# Patient Record
Sex: MALE | Race: OTHER | NOT HISPANIC OR LATINO
[De-identification: names, ages, dates, MRNs, and addresses within clinical notes are randomized per-mention and may not be internally consistent; named-entity substitution may affect disease eponyms.]

---

## 2019-04-09 ENCOUNTER — APPOINTMENT (OUTPATIENT)
Dept: ORTHOPEDIC SURGERY | Facility: CLINIC | Age: 44
End: 2019-04-09
Payer: COMMERCIAL

## 2019-04-09 ENCOUNTER — TRANSCRIPTION ENCOUNTER (OUTPATIENT)
Age: 44
End: 2019-04-09

## 2019-04-09 VITALS
HEIGHT: 68 IN | SYSTOLIC BLOOD PRESSURE: 124 MMHG | DIASTOLIC BLOOD PRESSURE: 82 MMHG | HEART RATE: 76 BPM | WEIGHT: 215 LBS | BODY MASS INDEX: 32.58 KG/M2

## 2019-04-09 DIAGNOSIS — M79.672 PAIN IN LEFT FOOT: ICD-10-CM

## 2019-04-09 PROCEDURE — 73630 X-RAY EXAM OF FOOT: CPT | Mod: LT

## 2019-04-09 PROCEDURE — 99203 OFFICE O/P NEW LOW 30 MIN: CPT

## 2019-04-14 PROBLEM — M79.672 LEFT FOOT PAIN: Status: ACTIVE | Noted: 2019-04-09

## 2019-11-22 ENCOUNTER — APPOINTMENT (OUTPATIENT)
Dept: ORTHOPEDIC SURGERY | Facility: CLINIC | Age: 44
End: 2019-11-22

## 2019-12-25 ENCOUNTER — TRANSCRIPTION ENCOUNTER (OUTPATIENT)
Age: 44
End: 2019-12-25

## 2019-12-25 ENCOUNTER — INPATIENT (INPATIENT)
Facility: HOSPITAL | Age: 44
LOS: 1 days | Discharge: HOME | DRG: 419 | End: 2019-12-27
Attending: SURGERY | Admitting: SURGERY
Payer: COMMERCIAL

## 2019-12-25 VITALS
OXYGEN SATURATION: 99 % | HEART RATE: 59 BPM | TEMPERATURE: 98 F | WEIGHT: 212.08 LBS | RESPIRATION RATE: 20 BRPM | HEIGHT: 68 IN | SYSTOLIC BLOOD PRESSURE: 148 MMHG | DIASTOLIC BLOOD PRESSURE: 98 MMHG

## 2019-12-25 PROCEDURE — 99285 EMERGENCY DEPT VISIT HI MDM: CPT

## 2019-12-25 RX ORDER — KETOROLAC TROMETHAMINE 30 MG/ML
15 SYRINGE (ML) INJECTION ONCE
Refills: 0 | Status: DISCONTINUED | OUTPATIENT
Start: 2019-12-25 | End: 2019-12-25

## 2019-12-25 RX ORDER — SODIUM CHLORIDE 9 MG/ML
1000 INJECTION, SOLUTION INTRAVENOUS ONCE
Refills: 0 | Status: COMPLETED | OUTPATIENT
Start: 2019-12-25 | End: 2019-12-25

## 2019-12-25 RX ORDER — ACETAMINOPHEN 500 MG
650 TABLET ORAL ONCE
Refills: 0 | Status: COMPLETED | OUTPATIENT
Start: 2019-12-25 | End: 2019-12-25

## 2019-12-25 NOTE — ED PROVIDER NOTE - CLINICAL SUMMARY MEDICAL DECISION MAKING FREE TEXT BOX
Manoj PGY-2:  43yo M h/o gallstones here with sudden RUQ starting while eating, +murpheys sign, concern for billiary pathology, will get bloodwork, RUQ US, pain control, IVF and reassess/dispo

## 2019-12-25 NOTE — ED PROVIDER NOTE - NS ED ROS FT
CONSTITUTIONAL: No fevers, no chills  Respiratory: No SOB  Gastrointestinal: refer to HPI  Genitourinary: no dysuria, no hematuria  SKIN: no rashes.  NEURO: no headache, no weakness or numbness  PSYCHIATRIC: no known mental health issues.

## 2019-12-25 NOTE — ED PROVIDER NOTE - PHYSICAL EXAMINATION
General: NAD  HEENT: pupils equal and reactive, normal external ears bilaterally   Cardiac: RRR, no MRG appreciated  Resp: lungs clear to auscultation bilaterally, symmetric chest wall rise  Abd: soft, TTP RUQ+ and epigastric, +Murpheys sign, nondistended,   : no CVA tenderness  Neuro: Moving all extremities  Skin:  normal color for race

## 2019-12-25 NOTE — ED PROVIDER NOTE - ATTENDING CONTRIBUTION TO CARE
Patient presenting complaining of RUQ/epigastric pains which began after eating earlier this evening.  Pains began about 10 minutes after eating.  Known history of gall stones.  Associated nausea, he self induced vomiting x1 but otherwise no vomiting.  No prior abdominal surgeries, normal bowel movements, no fevers.  Denying chest pains, shortness of breath.    A 14 point review of systems is negative except as in HPI or otherwise documented.    Exam:  General: Patient well appearing, vital signs within normal limits  HEENT: airway patent with moist mucous membranes  Cardiac: RRR S1/S2 with strong peripheral pulses  Respiratory: lungs clear without respiratory distress  GI: abdomen soft, tender to palpation in epigastrum/RUQ, positive Rivera's sign  Neuro: no gross neurologic deficits  Skin: warm, well perfused  Psych: normal mood and affect    Presentation consistent with biliary etiology of symptoms, plan for labs, lipase, RUQ US, symptom management.

## 2019-12-25 NOTE — ED PROVIDER NOTE - OBJECTIVE STATEMENT
43yo M pmhx of GERD, HTN, gallstones pw cc of abd pain    Was eating rice/bread had sudden onset epigastric pain around 630pm, improved slightly however continues to be painful prompting pt to come to ED. Vomited "purposely" by sticking finger in mouth as was nauseas, improved sx somewhat. Had pain over R lateral abdomen due to pain last week, saw GI (Dr. Darien Cottrell) and sono was scheduled. Dx w/ gallstones 3 years ago, had R lateral pain at that time.  No cardiac hx, negative stress test a year ago. No rashes. No burning on urination, onset of diarrhea today. no F/C.    Cardiologist: Dr. Mera, sees [email or web address]  PCP: Dr. Daron Alexis  Social: One shot of whisky 6pm today, drinks socially. Does not smoke, denies other substances  No past surgeries    Meds: Metoprolol  NDKA

## 2019-12-26 ENCOUNTER — RESULT REVIEW (OUTPATIENT)
Age: 44
End: 2019-12-26

## 2019-12-26 DIAGNOSIS — E11.65 TYPE 2 DIABETES MELLITUS WITH HYPERGLYCEMIA: ICD-10-CM

## 2019-12-26 DIAGNOSIS — K81.9 CHOLECYSTITIS, UNSPECIFIED: ICD-10-CM

## 2019-12-26 DIAGNOSIS — I10 ESSENTIAL (PRIMARY) HYPERTENSION: ICD-10-CM

## 2019-12-26 DIAGNOSIS — E78.5 HYPERLIPIDEMIA, UNSPECIFIED: ICD-10-CM

## 2019-12-26 LAB
ALBUMIN SERPL ELPH-MCNC: 3.8 G/DL — SIGNIFICANT CHANGE UP (ref 3.3–5)
ALBUMIN SERPL ELPH-MCNC: 4.3 G/DL — SIGNIFICANT CHANGE UP (ref 3.3–5)
ALP SERPL-CCNC: 64 U/L — SIGNIFICANT CHANGE UP (ref 40–120)
ALP SERPL-CCNC: 72 U/L — SIGNIFICANT CHANGE UP (ref 40–120)
ALT FLD-CCNC: 22 U/L — SIGNIFICANT CHANGE UP (ref 10–45)
ALT FLD-CCNC: 26 U/L — SIGNIFICANT CHANGE UP (ref 10–45)
ANION GAP SERPL CALC-SCNC: 11 MMOL/L — SIGNIFICANT CHANGE UP (ref 5–17)
ANION GAP SERPL CALC-SCNC: 15 MMOL/L — SIGNIFICANT CHANGE UP (ref 5–17)
APPEARANCE UR: CLEAR — SIGNIFICANT CHANGE UP
APTT BLD: 32.8 SEC — SIGNIFICANT CHANGE UP (ref 27.5–36.3)
AST SERPL-CCNC: 16 U/L — SIGNIFICANT CHANGE UP (ref 10–40)
AST SERPL-CCNC: 19 U/L — SIGNIFICANT CHANGE UP (ref 10–40)
B-OH-BUTYR SERPL-SCNC: 0.6 MMOL/L — HIGH
BASOPHILS # BLD AUTO: 0.07 K/UL — SIGNIFICANT CHANGE UP (ref 0–0.2)
BASOPHILS NFR BLD AUTO: 0.6 % — SIGNIFICANT CHANGE UP (ref 0–2)
BILIRUB SERPL-MCNC: 0.4 MG/DL — SIGNIFICANT CHANGE UP (ref 0.2–1.2)
BILIRUB SERPL-MCNC: 0.6 MG/DL — SIGNIFICANT CHANGE UP (ref 0.2–1.2)
BILIRUB UR-MCNC: NEGATIVE — SIGNIFICANT CHANGE UP
BLD GP AB SCN SERPL QL: NEGATIVE — SIGNIFICANT CHANGE UP
BUN SERPL-MCNC: 12 MG/DL — SIGNIFICANT CHANGE UP (ref 7–23)
BUN SERPL-MCNC: 14 MG/DL — SIGNIFICANT CHANGE UP (ref 7–23)
CALCIUM SERPL-MCNC: 8.9 MG/DL — SIGNIFICANT CHANGE UP (ref 8.4–10.5)
CALCIUM SERPL-MCNC: 9.5 MG/DL — SIGNIFICANT CHANGE UP (ref 8.4–10.5)
CHLORIDE SERPL-SCNC: 97 MMOL/L — SIGNIFICANT CHANGE UP (ref 96–108)
CHLORIDE SERPL-SCNC: 99 MMOL/L — SIGNIFICANT CHANGE UP (ref 96–108)
CO2 SERPL-SCNC: 24 MMOL/L — SIGNIFICANT CHANGE UP (ref 22–31)
CO2 SERPL-SCNC: 25 MMOL/L — SIGNIFICANT CHANGE UP (ref 22–31)
COLOR SPEC: SIGNIFICANT CHANGE UP
CREAT SERPL-MCNC: 0.67 MG/DL — SIGNIFICANT CHANGE UP (ref 0.5–1.3)
CREAT SERPL-MCNC: 0.78 MG/DL — SIGNIFICANT CHANGE UP (ref 0.5–1.3)
DIFF PNL FLD: ABNORMAL
EOSINOPHIL # BLD AUTO: 0.09 K/UL — SIGNIFICANT CHANGE UP (ref 0–0.5)
EOSINOPHIL NFR BLD AUTO: 0.7 % — SIGNIFICANT CHANGE UP (ref 0–6)
GAS PNL BLDV: SIGNIFICANT CHANGE UP
GLUCOSE BLDC GLUCOMTR-MCNC: 156 MG/DL — HIGH (ref 70–99)
GLUCOSE BLDC GLUCOMTR-MCNC: 257 MG/DL — HIGH (ref 70–99)
GLUCOSE BLDC GLUCOMTR-MCNC: 277 MG/DL — HIGH (ref 70–99)
GLUCOSE SERPL-MCNC: 167 MG/DL — HIGH (ref 70–99)
GLUCOSE SERPL-MCNC: 353 MG/DL — HIGH (ref 70–99)
GLUCOSE UR QL: ABNORMAL
HBA1C BLD-MCNC: 10.7 % — HIGH (ref 4–5.6)
HCT VFR BLD CALC: 41.8 % — SIGNIFICANT CHANGE UP (ref 39–50)
HCT VFR BLD CALC: 45.7 % — SIGNIFICANT CHANGE UP (ref 39–50)
HGB BLD-MCNC: 14 G/DL — SIGNIFICANT CHANGE UP (ref 13–17)
HGB BLD-MCNC: 15.6 G/DL — SIGNIFICANT CHANGE UP (ref 13–17)
IMM GRANULOCYTES NFR BLD AUTO: 0.4 % — SIGNIFICANT CHANGE UP (ref 0–1.5)
INR BLD: 1.18 RATIO — HIGH (ref 0.88–1.16)
KETONES UR-MCNC: ABNORMAL
LACTATE BLDV-MCNC: 1.6 MMOL/L — SIGNIFICANT CHANGE UP (ref 0.7–2)
LEUKOCYTE ESTERASE UR-ACNC: NEGATIVE — SIGNIFICANT CHANGE UP
LIDOCAIN IGE QN: 33 U/L — SIGNIFICANT CHANGE UP (ref 7–60)
LYMPHOCYTES # BLD AUTO: 1.8 K/UL — SIGNIFICANT CHANGE UP (ref 1–3.3)
LYMPHOCYTES # BLD AUTO: 14.3 % — SIGNIFICANT CHANGE UP (ref 13–44)
MCHC RBC-ENTMCNC: 27 PG — SIGNIFICANT CHANGE UP (ref 27–34)
MCHC RBC-ENTMCNC: 27.6 PG — SIGNIFICANT CHANGE UP (ref 27–34)
MCHC RBC-ENTMCNC: 33.5 GM/DL — SIGNIFICANT CHANGE UP (ref 32–36)
MCHC RBC-ENTMCNC: 34.1 GM/DL — SIGNIFICANT CHANGE UP (ref 32–36)
MCV RBC AUTO: 80.7 FL — SIGNIFICANT CHANGE UP (ref 80–100)
MCV RBC AUTO: 80.9 FL — SIGNIFICANT CHANGE UP (ref 80–100)
MONOCYTES # BLD AUTO: 0.63 K/UL — SIGNIFICANT CHANGE UP (ref 0–0.9)
MONOCYTES NFR BLD AUTO: 5 % — SIGNIFICANT CHANGE UP (ref 2–14)
NEUTROPHILS # BLD AUTO: 9.94 K/UL — HIGH (ref 1.8–7.4)
NEUTROPHILS NFR BLD AUTO: 79 % — HIGH (ref 43–77)
NITRITE UR-MCNC: NEGATIVE — SIGNIFICANT CHANGE UP
NRBC # BLD: 0 /100 WBCS — SIGNIFICANT CHANGE UP (ref 0–0)
NRBC # BLD: 0 /100 WBCS — SIGNIFICANT CHANGE UP (ref 0–0)
PH UR: 5.5 — SIGNIFICANT CHANGE UP (ref 5–8)
PLATELET # BLD AUTO: 304 K/UL — SIGNIFICANT CHANGE UP (ref 150–400)
PLATELET # BLD AUTO: 318 K/UL — SIGNIFICANT CHANGE UP (ref 150–400)
POTASSIUM SERPL-MCNC: 3.4 MMOL/L — LOW (ref 3.5–5.3)
POTASSIUM SERPL-MCNC: 5.3 MMOL/L — SIGNIFICANT CHANGE UP (ref 3.5–5.3)
POTASSIUM SERPL-SCNC: 3.4 MMOL/L — LOW (ref 3.5–5.3)
POTASSIUM SERPL-SCNC: 5.3 MMOL/L — SIGNIFICANT CHANGE UP (ref 3.5–5.3)
PROT SERPL-MCNC: 6.5 G/DL — SIGNIFICANT CHANGE UP (ref 6–8.3)
PROT SERPL-MCNC: 7.4 G/DL — SIGNIFICANT CHANGE UP (ref 6–8.3)
PROT UR-MCNC: ABNORMAL
PROTHROM AB SERPL-ACNC: 13.5 SEC — HIGH (ref 10–12.9)
RBC # BLD: 5.18 M/UL — SIGNIFICANT CHANGE UP (ref 4.2–5.8)
RBC # BLD: 5.65 M/UL — SIGNIFICANT CHANGE UP (ref 4.2–5.8)
RBC # FLD: 12.2 % — SIGNIFICANT CHANGE UP (ref 10.3–14.5)
RBC # FLD: 12.2 % — SIGNIFICANT CHANGE UP (ref 10.3–14.5)
RH IG SCN BLD-IMP: POSITIVE — SIGNIFICANT CHANGE UP
RH IG SCN BLD-IMP: POSITIVE — SIGNIFICANT CHANGE UP
SODIUM SERPL-SCNC: 135 MMOL/L — SIGNIFICANT CHANGE UP (ref 135–145)
SODIUM SERPL-SCNC: 136 MMOL/L — SIGNIFICANT CHANGE UP (ref 135–145)
SP GR SPEC: 1.04 — HIGH (ref 1.01–1.02)
UROBILINOGEN FLD QL: NEGATIVE — SIGNIFICANT CHANGE UP
WBC # BLD: 12.58 K/UL — HIGH (ref 3.8–10.5)
WBC # BLD: 9.65 K/UL — SIGNIFICANT CHANGE UP (ref 3.8–10.5)
WBC # FLD AUTO: 12.58 K/UL — HIGH (ref 3.8–10.5)
WBC # FLD AUTO: 9.65 K/UL — SIGNIFICANT CHANGE UP (ref 3.8–10.5)

## 2019-12-26 PROCEDURE — 99255 IP/OBS CONSLTJ NEW/EST HI 80: CPT

## 2019-12-26 PROCEDURE — 88304 TISSUE EXAM BY PATHOLOGIST: CPT | Mod: 26

## 2019-12-26 PROCEDURE — 76705 ECHO EXAM OF ABDOMEN: CPT | Mod: 26,RT

## 2019-12-26 RX ORDER — DEXTROSE 50 % IN WATER 50 %
25 SYRINGE (ML) INTRAVENOUS ONCE
Refills: 0 | Status: DISCONTINUED | OUTPATIENT
Start: 2019-12-26 | End: 2019-12-27

## 2019-12-26 RX ORDER — IBUPROFEN 200 MG
400 TABLET ORAL EVERY 6 HOURS
Refills: 0 | Status: DISCONTINUED | OUTPATIENT
Start: 2019-12-26 | End: 2019-12-27

## 2019-12-26 RX ORDER — ONDANSETRON 8 MG/1
4 TABLET, FILM COATED ORAL ONCE
Refills: 0 | Status: DISCONTINUED | OUTPATIENT
Start: 2019-12-26 | End: 2019-12-26

## 2019-12-26 RX ORDER — SODIUM CHLORIDE 9 MG/ML
1000 INJECTION, SOLUTION INTRAVENOUS ONCE
Refills: 0 | Status: COMPLETED | OUTPATIENT
Start: 2019-12-26 | End: 2019-12-26

## 2019-12-26 RX ORDER — SODIUM CHLORIDE 9 MG/ML
1000 INJECTION INTRAMUSCULAR; INTRAVENOUS; SUBCUTANEOUS
Refills: 0 | Status: DISCONTINUED | OUTPATIENT
Start: 2019-12-26 | End: 2019-12-26

## 2019-12-26 RX ORDER — SODIUM CHLORIDE 9 MG/ML
1000 INJECTION, SOLUTION INTRAVENOUS
Refills: 0 | Status: DISCONTINUED | OUTPATIENT
Start: 2019-12-26 | End: 2019-12-27

## 2019-12-26 RX ORDER — INSULIN GLARGINE 100 [IU]/ML
15 INJECTION, SOLUTION SUBCUTANEOUS AT BEDTIME
Refills: 0 | Status: DISCONTINUED | OUTPATIENT
Start: 2019-12-26 | End: 2019-12-27

## 2019-12-26 RX ORDER — HYDROMORPHONE HYDROCHLORIDE 2 MG/ML
0.5 INJECTION INTRAMUSCULAR; INTRAVENOUS; SUBCUTANEOUS
Refills: 0 | Status: DISCONTINUED | OUTPATIENT
Start: 2019-12-26 | End: 2019-12-26

## 2019-12-26 RX ORDER — GLUCAGON INJECTION, SOLUTION 0.5 MG/.1ML
1 INJECTION, SOLUTION SUBCUTANEOUS ONCE
Refills: 0 | Status: DISCONTINUED | OUTPATIENT
Start: 2019-12-26 | End: 2019-12-27

## 2019-12-26 RX ORDER — ACETAMINOPHEN 500 MG
650 TABLET ORAL EVERY 6 HOURS
Refills: 0 | Status: DISCONTINUED | OUTPATIENT
Start: 2019-12-26 | End: 2019-12-27

## 2019-12-26 RX ORDER — HYDROMORPHONE HYDROCHLORIDE 2 MG/ML
0.5 INJECTION INTRAMUSCULAR; INTRAVENOUS; SUBCUTANEOUS EVERY 4 HOURS
Refills: 0 | Status: DISCONTINUED | OUTPATIENT
Start: 2019-12-26 | End: 2019-12-26

## 2019-12-26 RX ORDER — SODIUM CHLORIDE 9 MG/ML
1000 INJECTION, SOLUTION INTRAVENOUS
Refills: 0 | Status: DISCONTINUED | OUTPATIENT
Start: 2019-12-26 | End: 2019-12-26

## 2019-12-26 RX ORDER — ENOXAPARIN SODIUM 100 MG/ML
40 INJECTION SUBCUTANEOUS DAILY
Refills: 0 | Status: DISCONTINUED | OUTPATIENT
Start: 2019-12-26 | End: 2019-12-26

## 2019-12-26 RX ORDER — INSULIN LISPRO 100/ML
VIAL (ML) SUBCUTANEOUS EVERY 6 HOURS
Refills: 0 | Status: DISCONTINUED | OUTPATIENT
Start: 2019-12-26 | End: 2019-12-26

## 2019-12-26 RX ORDER — ACETAMINOPHEN 500 MG
650 TABLET ORAL EVERY 6 HOURS
Refills: 0 | Status: DISCONTINUED | OUTPATIENT
Start: 2019-12-26 | End: 2019-12-26

## 2019-12-26 RX ORDER — ERTAPENEM SODIUM 1 G/1
1000 INJECTION, POWDER, LYOPHILIZED, FOR SOLUTION INTRAMUSCULAR; INTRAVENOUS EVERY 24 HOURS
Refills: 0 | Status: DISCONTINUED | OUTPATIENT
Start: 2019-12-26 | End: 2019-12-26

## 2019-12-26 RX ORDER — ENOXAPARIN SODIUM 100 MG/ML
40 INJECTION SUBCUTANEOUS DAILY
Refills: 0 | Status: DISCONTINUED | OUTPATIENT
Start: 2019-12-26 | End: 2019-12-27

## 2019-12-26 RX ORDER — INSULIN LISPRO 100/ML
VIAL (ML) SUBCUTANEOUS
Refills: 0 | Status: DISCONTINUED | OUTPATIENT
Start: 2019-12-26 | End: 2019-12-26

## 2019-12-26 RX ORDER — OXYCODONE HYDROCHLORIDE 5 MG/1
5 TABLET ORAL EVERY 6 HOURS
Refills: 0 | Status: DISCONTINUED | OUTPATIENT
Start: 2019-12-26 | End: 2019-12-27

## 2019-12-26 RX ORDER — INSULIN LISPRO 100/ML
VIAL (ML) SUBCUTANEOUS AT BEDTIME
Refills: 0 | Status: DISCONTINUED | OUTPATIENT
Start: 2019-12-26 | End: 2019-12-27

## 2019-12-26 RX ORDER — INSULIN LISPRO 100/ML
VIAL (ML) SUBCUTANEOUS
Refills: 0 | Status: DISCONTINUED | OUTPATIENT
Start: 2019-12-26 | End: 2019-12-27

## 2019-12-26 RX ORDER — METOPROLOL TARTRATE 50 MG
50 TABLET ORAL DAILY
Refills: 0 | Status: DISCONTINUED | OUTPATIENT
Start: 2019-12-26 | End: 2019-12-26

## 2019-12-26 RX ORDER — DEXTROSE 50 % IN WATER 50 %
15 SYRINGE (ML) INTRAVENOUS ONCE
Refills: 0 | Status: DISCONTINUED | OUTPATIENT
Start: 2019-12-26 | End: 2019-12-27

## 2019-12-26 RX ORDER — INSULIN LISPRO 100/ML
5 VIAL (ML) SUBCUTANEOUS
Refills: 0 | Status: DISCONTINUED | OUTPATIENT
Start: 2019-12-26 | End: 2019-12-27

## 2019-12-26 RX ORDER — POTASSIUM CHLORIDE 20 MEQ
10 PACKET (EA) ORAL
Refills: 0 | Status: DISCONTINUED | OUTPATIENT
Start: 2019-12-26 | End: 2019-12-26

## 2019-12-26 RX ORDER — INSULIN GLARGINE 100 [IU]/ML
15 INJECTION, SOLUTION SUBCUTANEOUS AT BEDTIME
Refills: 0 | Status: DISCONTINUED | OUTPATIENT
Start: 2019-12-26 | End: 2019-12-26

## 2019-12-26 RX ORDER — DEXTROSE 50 % IN WATER 50 %
12.5 SYRINGE (ML) INTRAVENOUS ONCE
Refills: 0 | Status: DISCONTINUED | OUTPATIENT
Start: 2019-12-26 | End: 2019-12-27

## 2019-12-26 RX ADMIN — ENOXAPARIN SODIUM 40 MILLIGRAM(S): 100 INJECTION SUBCUTANEOUS at 23:03

## 2019-12-26 RX ADMIN — Medication 650 MILLIGRAM(S): at 23:40

## 2019-12-26 RX ADMIN — SODIUM CHLORIDE 125 MILLILITER(S): 9 INJECTION INTRAMUSCULAR; INTRAVENOUS; SUBCUTANEOUS at 05:16

## 2019-12-26 RX ADMIN — Medication 400 MILLIGRAM(S): at 23:40

## 2019-12-26 RX ADMIN — SODIUM CHLORIDE 1000 MILLILITER(S): 9 INJECTION, SOLUTION INTRAVENOUS at 00:00

## 2019-12-26 RX ADMIN — Medication 650 MILLIGRAM(S): at 05:16

## 2019-12-26 RX ADMIN — SODIUM CHLORIDE 1000 MILLILITER(S): 9 INJECTION, SOLUTION INTRAVENOUS at 01:55

## 2019-12-26 RX ADMIN — Medication 6: at 09:30

## 2019-12-26 RX ADMIN — Medication 650 MILLIGRAM(S): at 04:50

## 2019-12-26 RX ADMIN — Medication 400 MILLIGRAM(S): at 23:05

## 2019-12-26 RX ADMIN — INSULIN GLARGINE 15 UNIT(S): 100 INJECTION, SOLUTION SUBCUTANEOUS at 23:04

## 2019-12-26 RX ADMIN — Medication 650 MILLIGRAM(S): at 00:00

## 2019-12-26 RX ADMIN — Medication 650 MILLIGRAM(S): at 23:05

## 2019-12-26 RX ADMIN — Medication 2: at 12:44

## 2019-12-26 RX ADMIN — Medication 1: at 23:04

## 2019-12-26 RX ADMIN — Medication 15 MILLIGRAM(S): at 04:50

## 2019-12-26 RX ADMIN — ERTAPENEM SODIUM 120 MILLIGRAM(S): 1 INJECTION, POWDER, LYOPHILIZED, FOR SOLUTION INTRAMUSCULAR; INTRAVENOUS at 08:30

## 2019-12-26 RX ADMIN — Medication 15 MILLIGRAM(S): at 00:00

## 2019-12-26 NOTE — H&P ADULT - ASSESSMENT
45yo M with hx of GERD, HTN, and gallstones presented 12/25 with 1 day of mid upper abd pain radiating across his abdomen associated with chills that started after he ate dinner around 6:30pm. U/S showed cholelithiasis with trace pericholecystic fluid, all thickening, positive Rivera's sign, and moderate to high suspicion for acute cholecystitis.    Plan  - Admit to [facility]  - NPO with IV fluids  - Pain control  - Consent for lap elly  - Ertapenem antibiotics  - Discussed with Dr. CHE Wallace

## 2019-12-26 NOTE — H&P ADULT - NSHPPHYSICALEXAM_GEN_ALL_CORE
Physical Exam:  Gen: NAD  LS: nml respiratory effort  Card: pulse regularly present  GI: abd soft, mild RUQ > epigastric tenderness to palpation  Ext: warm

## 2019-12-26 NOTE — CONSULT NOTE ADULT - ASSESSMENT
44 year old M with PMH GERD, HTN, HLD, DM2 not on medications, and gallstones presented with abdominal pain, admitted for management of possible acute cholecystitis, also with hyperglycemia in setting of known DM2. HbA1c pending. BG goal 100-180 mg/dL.

## 2019-12-26 NOTE — H&P ADULT - NSHPLABSRESULTS_GEN_ALL_CORE
15.6                  Neurophils% (auto):   79.0   (12-26 @ 00:18):    12.58)-----------(318          Lymphocytes% (auto):  14.3                                          45.7                   Eosinphils% (auto):   0.7      Manual%: Neutrophils x    ; Lymphocytes x    ; Eosinophils x    ; Bands%: x    ; Blasts x          12-26    136  |  97  |  14  ----------------------------<  353<H>  5.3   |  24  |  0.78    Ca    9.5      26 Dec 2019 00:18    TPro  7.4  /  Alb  4.3  /  TBili  0.4  /  DBili  x   /  AST  19  /  ALT  26  /  AlkPhos  72  12-26    ( 12-26 @ 00:18 )   PT: 13.5 sec;   INR: 1.18 ratio  aPTT: 32.8 sec      VBG - ( 26 Dec 2019 03:16 )  pH: 7.40  /  pCO2: 40    /  pO2: 63    / HCO3: 25    / Base Excess: 0.4   /  SvO2: 92    / Lactate: 2.2      RECENT CULTURES:        Imaging:  EXAM:  US ABDOMEN RT UPR QUADRANT                          PROCEDURE DATE:  12/26/2019      INTERPRETATION:  CLINICAL INFORMATION: Epigastric pain. History of gallstones. Patient was premedicated.    COMPARISON: None available.    TECHNIQUE: Sonography of the right upper quadrant.     FINDINGS:    Liver: 19.8 cm. Steatosis.    Bile ducts: Normal caliber. Common bile duct measures up to 0.6 cm.     Gallbladder: Gallstones and sludge with trace pericholecystic fluid present. The wall measures up to 0.3 cm. Positive Rivera sign in the setting of premedication.    Pancreas: Visualized portions are within normal limits.    Right kidney: 12.4 cm. No hydronephrosis.    Ascites: None.    Aorta/IVC: Visualized portions are within normal limits.    IMPRESSION:     Cholelithiasis with trace pericholecystic fluid and wall thickening. Moderate to high suspicion for acute cholecystitis. A HIDA scan can be performed for further evaluation.

## 2019-12-26 NOTE — ED ADULT NURSE NOTE - NSIMPLEMENTINTERV_GEN_ALL_ED
Implemented All Universal Safety Interventions:  [place] to call system. Call bell, personal items and telephone within reach. Instruct patient to call for assistance. Room bathroom lighting operational. Non-slip footwear when patient is off stretcher. Physically safe environment: no spills, clutter or unnecessary equipment. Stretcher in lowest position, wheels locked, appropriate side rails in place.

## 2019-12-26 NOTE — CONSULT NOTE ADULT - SUBJECTIVE AND OBJECTIVE BOX
HPI:  44 year old M with PMH GERD, HTN, HLD, DM2 not on medications, and gallstones presented with abdominal pain, admitted for management of possible acute cholecystitis. Consult called for management of DM2. Serum glucose was 353 on admission. Patient states that he was diagnosed with DM2 about 2 years ago, at which time HbA1c was 7.3%. He states that with diet and exercise, he improved his HbA1c to 6.5%. Earlier this year, he was prescribed Metformin by his PMD. He took it for several days but it upset his stomach so he stopped taking it. Does not have a glucometer at home so he does not check BG. He does not have polyuria or blurry vision. He states that he has dry mouth in the AM only associated with polydipsia in the mornings only. Has had nocturia recently. He has no symptoms of neuropathy in the hands or the feet. No history of nephropathy. He follows with optho every 3 months for history of "pressure" in the eyes, no known retinopathy. Regarding his diet, he does not drink soda or juice, admittedly has been having more sweets in the last few weeks.     PAST MEDICAL & SURGICAL HISTORY:  Gallstones  HTN (hypertension)  HLD  Diabetes mellitus  H/O gastroesophageal reflux (GERD)      FAMILY HISTORY:  DM2 in mother and grandparent    Social History:  Former smoker, quit 14 years ago  No alcohol use      Outpatient Medications:  No medications for DM    MEDICATIONS  (STANDING):  enoxaparin Injectable 40 milliGRAM(s) SubCutaneous daily  ertapenem  IVPB 1000 milliGRAM(s) IV Intermittent every 24 hours  insulin lispro (HumaLOG) corrective regimen sliding scale   SubCutaneous every 6 hours  metoprolol tartrate 50 milliGRAM(s) Oral daily  sodium chloride 0.9%. 1000 milliLiter(s) (125 mL/Hr) IV Continuous <Continuous>    MEDICATIONS  (PRN):  acetaminophen   Tablet .. 650 milliGRAM(s) Oral every 6 hours PRN Mild Pain (1 - 3)  HYDROmorphone  Injectable 0.5 milliGRAM(s) IV Push every 4 hours PRN Moderate Pain (4 - 6)    Allergies  No Known Allergies    Review of Systems:  Constitutional: No fever  Eyes: No blurry vision  Neuro: No tremors  HEENT: No pain  Cardiovascular: No chest pain, palpitations  Respiratory: No SOB, no cough  GI: No nausea, vomiting, abdominal pain  : No dysuria; + nocturia  Skin: no rash  Psych: no depression  Endocrine: no polyuria; + polydipsia in AM only    ALL OTHER SYSTEMS REVIEWED AND NEGATIVE    PHYSICAL EXAM:  VITALS: T(C): 36.8 (12-26-19 @ 09:09)  T(F): 98.3 (12-26-19 @ 09:09), Max: 98.6 (12-26-19 @ 07:47)  HR: 64 (12-26-19 @ 09:09) (59 - 85)  BP: 133/75 (12-26-19 @ 09:09) (115/73 - 148/98)  RR:  (16 - 20)  SpO2:  (95% - 99%)  Wt(kg): --  GENERAL: NAD, well-developed  EYES: No proptosis, anicteric  HEENT:  Atraumatic, Normocephalic  THYROID: Normal size, no palpable nodules  RESPIRATORY: Clear to auscultation bilaterally; No rales, rhonchi, wheezing  CARDIOVASCULAR: Regular rate and rhythm; No murmurs; no peripheral edema  GI: Soft, nontender, non distended, normal bowel sounds  SKIN: Dry, intact, No ulcers on feet; + acanthosis nigricans on neck  MUSCULOSKELETAL: Full range of motion, normal strength  PSYCH: Alert and oriented x 3, reactive affect      POCT Blood Glucose.: 156 mg/dL (12-26-19 @ 12:21) H 2  POCT Blood Glucose.: 257 mg/dL (12-26-19 @ 08:41) H 6                          14.0   9.65  )-----------( 304      ( 26 Dec 2019 12:51 )             41.8       12-26    135  |  99  |  12  ----------------------------<  167<H>  3.4<L>   |  25  |  0.67    EGFR if : 134  EGFR if non : 116    Ca    8.9      12-26    TPro  6.5  /  Alb  3.8  /  TBili  0.6  /  DBili  x   /  AST  16  /  ALT  22  /  AlkPhos  64  12-26

## 2019-12-26 NOTE — CONSULT NOTE ADULT - PROBLEM SELECTOR RECOMMENDATION 3
- check lipid panel in AM  - consider starting moderate intensity statin such as Lipitor 20 mg qhs if there is no contraindication

## 2019-12-26 NOTE — CONSULT NOTE ADULT - PROBLEM SELECTOR RECOMMENDATION 9
- follow up HbA1c  - for now, would monitor on correction scale while HbA1c is pending  - consistent carb diet when eating  - obtain nutrition consult  - check FS q6 while NPO, otherwise qac and qhs when on po diet  - will follow  - discharge recommendations to be determined by HbA1c and insulin requirements while inpatient. He can follow up in our office on discharge - [phone] - [address] [facility], [address] - HbA1c 10.7%, above goal. Goal HbA1c < 7.0%  - consistent carb diet when eating  - obtain nutrition consult  - check FS q6 while NPO, otherwise qac and qhs when on po diet. Would dose  - will follow  - discharge recommendations to be determined by HbA1c and insulin requirements while inpatient. He can follow up in our office on discharge - [phone] - [address] [facility], [address] - HbA1c 10.7%, above goal. Goal HbA1c < 7.0%  - consistent carb diet when eating  - obtain nutrition consult  - check FS q6 while NPO, otherwise qac and qhs when on po diet.   - Would dose Lantus 15 units qhs tonight while NPO. Plan to start standing pre-meal Humalog once diet is advanced.   - change scale to low correction scale q6   - will follow  - discharge recommendations: likely dc on oral agents, such as Metformin ER + DDP4 inhibitor. He can follow up in our office on discharge - [phone] - [address] [facility], [address]

## 2019-12-26 NOTE — ED ADULT NURSE REASSESSMENT NOTE - NS ED NURSE REASSESS COMMENT FT1
Report received from RAJ Carreon. Pt back from ultrasound and resting comfortably with family at bedside. Awaiting results of labs and ultrasound. VSS. Safety maintained at all times, bed in lowest position, call bell in reach. Will continue to monitor closely.

## 2019-12-26 NOTE — H&P ADULT - HISTORY OF PRESENT ILLNESS
43yo M with hx of GERD, HTN, and gallstones presented 12/25 with 1 day of mid upper abd pain radiating across his abdomen associated with chills that started after he ate dinner around 6:30pm. He was not nauseous, but tried to induce vomiting to relieve upper abdominal discomfort, but did not vomit. His pain continued without resolution prompting his ED presentation. Patient noted history of right lower abdominal pain ~2yr ago for which he had an U/S that showed gallstones. He also had an episode of lower abdominal pain ~2wk ago after eating that resolved in a few hours. In the ED, WBC~12.6 and U/S showed cholelithiasis with trace pericholecystic fluid, all thickening, positive Rivera's sign, and moderate to high suspicion for acute cholecystitis.

## 2019-12-26 NOTE — CONSULT NOTE ADULT - ATTENDING COMMENTS
Martinez Levin MD   Pager # [phone]  On evenings and weekends, please call the office at [phone] or page endocrine fellow on call. Please note that this patient may be followed by different provider tomorrow. If no answer, contact the office.

## 2019-12-27 ENCOUNTER — TRANSCRIPTION ENCOUNTER (OUTPATIENT)
Age: 44
End: 2019-12-27

## 2019-12-27 VITALS
HEART RATE: 74 BPM | RESPIRATION RATE: 18 BRPM | OXYGEN SATURATION: 94 % | DIASTOLIC BLOOD PRESSURE: 81 MMHG | SYSTOLIC BLOOD PRESSURE: 127 MMHG | TEMPERATURE: 98 F

## 2019-12-27 PROBLEM — Z87.19 PERSONAL HISTORY OF OTHER DISEASES OF THE DIGESTIVE SYSTEM: Chronic | Status: ACTIVE | Noted: 2019-12-26

## 2019-12-27 PROBLEM — K80.20 CALCULUS OF GALLBLADDER WITHOUT CHOLECYSTITIS WITHOUT OBSTRUCTION: Chronic | Status: ACTIVE | Noted: 2019-12-26

## 2019-12-27 PROBLEM — I10 ESSENTIAL (PRIMARY) HYPERTENSION: Chronic | Status: ACTIVE | Noted: 2019-12-26

## 2019-12-27 LAB
ALBUMIN SERPL ELPH-MCNC: 3.5 G/DL — SIGNIFICANT CHANGE UP (ref 3.3–5)
ALP SERPL-CCNC: 79 U/L — SIGNIFICANT CHANGE UP (ref 40–120)
ALT FLD-CCNC: 102 U/L — HIGH (ref 10–45)
ANION GAP SERPL CALC-SCNC: 13 MMOL/L — SIGNIFICANT CHANGE UP (ref 5–17)
AST SERPL-CCNC: 87 U/L — HIGH (ref 10–40)
BILIRUB SERPL-MCNC: 0.6 MG/DL — SIGNIFICANT CHANGE UP (ref 0.2–1.2)
BUN SERPL-MCNC: 10 MG/DL — SIGNIFICANT CHANGE UP (ref 7–23)
CALCIUM SERPL-MCNC: 8.3 MG/DL — LOW (ref 8.4–10.5)
CHLORIDE SERPL-SCNC: 102 MMOL/L — SIGNIFICANT CHANGE UP (ref 96–108)
CO2 SERPL-SCNC: 21 MMOL/L — LOW (ref 22–31)
CREAT SERPL-MCNC: 0.65 MG/DL — SIGNIFICANT CHANGE UP (ref 0.5–1.3)
CULTURE RESULTS: NO GROWTH — SIGNIFICANT CHANGE UP
GLUCOSE BLDC GLUCOMTR-MCNC: 191 MG/DL — HIGH (ref 70–99)
GLUCOSE BLDC GLUCOMTR-MCNC: 247 MG/DL — HIGH (ref 70–99)
GLUCOSE SERPL-MCNC: 237 MG/DL — HIGH (ref 70–99)
HCT VFR BLD CALC: 40.2 % — SIGNIFICANT CHANGE UP (ref 39–50)
HGB BLD-MCNC: 13.4 G/DL — SIGNIFICANT CHANGE UP (ref 13–17)
MAGNESIUM SERPL-MCNC: 1.7 MG/DL — SIGNIFICANT CHANGE UP (ref 1.6–2.6)
MCHC RBC-ENTMCNC: 27.1 PG — SIGNIFICANT CHANGE UP (ref 27–34)
MCHC RBC-ENTMCNC: 33.3 GM/DL — SIGNIFICANT CHANGE UP (ref 32–36)
MCV RBC AUTO: 81.4 FL — SIGNIFICANT CHANGE UP (ref 80–100)
PHOSPHATE SERPL-MCNC: 3.7 MG/DL — SIGNIFICANT CHANGE UP (ref 2.5–4.5)
PLATELET # BLD AUTO: 304 K/UL — SIGNIFICANT CHANGE UP (ref 150–400)
POTASSIUM SERPL-MCNC: 4 MMOL/L — SIGNIFICANT CHANGE UP (ref 3.5–5.3)
POTASSIUM SERPL-SCNC: 4 MMOL/L — SIGNIFICANT CHANGE UP (ref 3.5–5.3)
PROT SERPL-MCNC: 6.1 G/DL — SIGNIFICANT CHANGE UP (ref 6–8.3)
RBC # BLD: 4.94 M/UL — SIGNIFICANT CHANGE UP (ref 4.2–5.8)
RBC # FLD: 12.3 % — SIGNIFICANT CHANGE UP (ref 10.3–14.5)
SODIUM SERPL-SCNC: 136 MMOL/L — SIGNIFICANT CHANGE UP (ref 135–145)
SPECIMEN SOURCE: SIGNIFICANT CHANGE UP
WBC # BLD: 8.61 K/UL — SIGNIFICANT CHANGE UP (ref 3.8–10.5)
WBC # FLD AUTO: 8.61 K/UL — SIGNIFICANT CHANGE UP (ref 3.8–10.5)

## 2019-12-27 PROCEDURE — 84100 ASSAY OF PHOSPHORUS: CPT

## 2019-12-27 PROCEDURE — 82962 GLUCOSE BLOOD TEST: CPT

## 2019-12-27 PROCEDURE — 83036 HEMOGLOBIN GLYCOSYLATED A1C: CPT

## 2019-12-27 PROCEDURE — 82330 ASSAY OF CALCIUM: CPT

## 2019-12-27 PROCEDURE — 80053 COMPREHEN METABOLIC PANEL: CPT

## 2019-12-27 PROCEDURE — C1889: CPT

## 2019-12-27 PROCEDURE — 96374 THER/PROPH/DIAG INJ IV PUSH: CPT

## 2019-12-27 PROCEDURE — 85027 COMPLETE CBC AUTOMATED: CPT

## 2019-12-27 PROCEDURE — 76705 ECHO EXAM OF ABDOMEN: CPT

## 2019-12-27 PROCEDURE — 86901 BLOOD TYPING SEROLOGIC RH(D): CPT

## 2019-12-27 PROCEDURE — 84132 ASSAY OF SERUM POTASSIUM: CPT

## 2019-12-27 PROCEDURE — 82010 KETONE BODYS QUAN: CPT

## 2019-12-27 PROCEDURE — 81001 URINALYSIS AUTO W/SCOPE: CPT

## 2019-12-27 PROCEDURE — 85014 HEMATOCRIT: CPT

## 2019-12-27 PROCEDURE — 84295 ASSAY OF SERUM SODIUM: CPT

## 2019-12-27 PROCEDURE — 83690 ASSAY OF LIPASE: CPT

## 2019-12-27 PROCEDURE — 82435 ASSAY OF BLOOD CHLORIDE: CPT

## 2019-12-27 PROCEDURE — 99285 EMERGENCY DEPT VISIT HI MDM: CPT | Mod: 25

## 2019-12-27 PROCEDURE — 82803 BLOOD GASES ANY COMBINATION: CPT

## 2019-12-27 PROCEDURE — 86850 RBC ANTIBODY SCREEN: CPT

## 2019-12-27 PROCEDURE — 86900 BLOOD TYPING SEROLOGIC ABO: CPT

## 2019-12-27 PROCEDURE — 99233 SBSQ HOSP IP/OBS HIGH 50: CPT

## 2019-12-27 PROCEDURE — 87086 URINE CULTURE/COLONY COUNT: CPT

## 2019-12-27 PROCEDURE — 85730 THROMBOPLASTIN TIME PARTIAL: CPT

## 2019-12-27 PROCEDURE — 83735 ASSAY OF MAGNESIUM: CPT

## 2019-12-27 PROCEDURE — 85610 PROTHROMBIN TIME: CPT

## 2019-12-27 PROCEDURE — 82947 ASSAY GLUCOSE BLOOD QUANT: CPT

## 2019-12-27 PROCEDURE — 88304 TISSUE EXAM BY PATHOLOGIST: CPT

## 2019-12-27 PROCEDURE — 83605 ASSAY OF LACTIC ACID: CPT

## 2019-12-27 RX ORDER — METFORMIN HYDROCHLORIDE 850 MG/1
500 TABLET ORAL
Qty: 60 | Refills: 0
Start: 2019-12-27 | End: 2020-02-24

## 2019-12-27 RX ORDER — SITAGLIPTIN 50 MG/1
1 TABLET, FILM COATED ORAL
Qty: 30 | Refills: 0
Start: 2019-12-27 | End: 2020-01-25

## 2019-12-27 RX ORDER — ATORVASTATIN CALCIUM 80 MG/1
1 TABLET, FILM COATED ORAL
Qty: 30 | Refills: 0
Start: 2019-12-27 | End: 2020-01-25

## 2019-12-27 RX ORDER — IBUPROFEN 200 MG
1 TABLET ORAL
Qty: 0 | Refills: 0 | DISCHARGE
Start: 2019-12-27

## 2019-12-27 RX ORDER — ATORVASTATIN CALCIUM 80 MG/1
20 TABLET, FILM COATED ORAL AT BEDTIME
Refills: 0 | Status: DISCONTINUED | OUTPATIENT
Start: 2019-12-27 | End: 2019-12-27

## 2019-12-27 RX ORDER — OXYCODONE HYDROCHLORIDE 5 MG/1
1 TABLET ORAL
Qty: 8 | Refills: 0
Start: 2019-12-27 | End: 2019-12-28

## 2019-12-27 RX ORDER — ACETAMINOPHEN 500 MG
2 TABLET ORAL
Qty: 0 | Refills: 0 | DISCHARGE
Start: 2019-12-27

## 2019-12-27 RX ADMIN — Medication 650 MILLIGRAM(S): at 09:13

## 2019-12-27 RX ADMIN — Medication 650 MILLIGRAM(S): at 12:31

## 2019-12-27 RX ADMIN — ENOXAPARIN SODIUM 40 MILLIGRAM(S): 100 INJECTION SUBCUTANEOUS at 12:29

## 2019-12-27 RX ADMIN — Medication 400 MILLIGRAM(S): at 05:33

## 2019-12-27 RX ADMIN — Medication 5 UNIT(S): at 09:12

## 2019-12-27 RX ADMIN — Medication 400 MILLIGRAM(S): at 06:16

## 2019-12-27 RX ADMIN — Medication 2: at 13:42

## 2019-12-27 RX ADMIN — Medication 1: at 09:12

## 2019-12-27 RX ADMIN — Medication 5 UNIT(S): at 13:41

## 2019-12-27 RX ADMIN — Medication 400 MILLIGRAM(S): at 12:29

## 2019-12-27 RX ADMIN — Medication 650 MILLIGRAM(S): at 12:29

## 2019-12-27 RX ADMIN — Medication 400 MILLIGRAM(S): at 12:31

## 2019-12-27 NOTE — PROGRESS NOTE ADULT - ASSESSMENT
44 yr old male POS 1 lap elly     Plan   - OOB   - pain control   - anesthesia follow-up possible ENT consult   - discharge home 44 yr old male POD 1 s/p lap elly     Plan   - OOB   - pain control   - anesthesia follow-up possible ENT consult   - discharge home

## 2019-12-27 NOTE — DISCHARGE NOTE PROVIDER - NSDCFUADDAPPT_GEN_ALL_CORE_FT
Please follow up with Endocrinology within one week of discharge.     Please follow up with Dr. Wallace in his office within 2 weeks of discharge.

## 2019-12-27 NOTE — DISCHARGE NOTE PROVIDER - NSDCMRMEDTOKEN_GEN_ALL_CORE_FT
acetaminophen 325 mg oral tablet: 2 tab(s) orally every 6 hours  atorvastatin 20 mg oral tablet: 1 tab(s) orally once a day (at bedtime) MDD:1 tab  glucometer (per patient&#x27;s insurance): Test blood sugars four times a day. Dispense #1 glucometer.  ibuprofen 400 mg oral tablet: 1 tab(s) orally every 6 hours  Januvia 100 mg oral tablet: 1 tab(s) orally once a day   lancets: 1 application subcutaneously 4 times a day   metFORMIN 500 mg oral tablet, extended release: 500 milligram(s) orally once a day.   Start January 3rd one tablet a day.  If no diarrhea or abdominal cramps increase dose to 2 tablets January 17 2020  oxyCODONE 5 mg oral tablet: 1 tab(s) orally every 6 hours, As needed, Severe Pain (7 - 10) MDD:4 tabs  test strips (per patient&#x27;s insurance): 1 application subcutaneously 4 times a day. ** Compatible with patient&#x27;s glucometer ** acetaminophen 325 mg oral tablet: 2 tab(s) orally every 6 hours  atorvastatin 20 mg oral tablet: 1 tab(s) orally once a day (at bedtime) MDD:1 tab  glipiZIDE 2.5 mg oral tablet, extended release: 1 tab(s) orally once a day  Take medication once daily in the morning You must have 3 meals a day while taking this medication. Do Not skip meals while taking this medication. MDD:1 tabs   glucometer (per patient&#x27;s insurance): Test blood sugars four times a day. Dispense #1 glucometer.  ibuprofen 400 mg oral tablet: 1 tab(s) orally every 6 hours  Januvia 100 mg oral tablet: 1 tab(s) orally once a day   lancets: 1 application subcutaneously 4 times a day   metFORMIN 500 mg oral tablet, extended release: 500 milligram(s) orally once a day.   Start January 3rd one tablet a day.  If no diarrhea or abdominal cramps increase dose to 2 tablets January 17 2020  oxyCODONE 5 mg oral tablet: 1 tab(s) orally every 6 hours, As needed, Severe Pain (7 - 10) MDD:4 tabs  test strips (per patient&#x27;s insurance): 1 application subcutaneously 4 times a day. ** Compatible with patient&#x27;s glucometer ** acetaminophen 325 mg oral tablet: 2 tab(s) orally every 6 hours  atorvastatin 20 mg oral tablet: 1 tab(s) orally once a day (at bedtime) MDD:1 tab  glipiZIDE 2.5 mg oral tablet, extended release: 1 tab(s) orally once a day  Take medication once daily in the morning You must have 3 meals a day while taking this medication. Do Not skip meals while taking this medication. MDD:1 tabs   glucometer (per patient&#x27;s insurance): Test blood sugars four times a day. Dispense #1 glucometer.  ibuprofen 400 mg oral tablet: 1 tab(s) orally every 6 hours  lancets: 1 application subcutaneously 4 times a day   metFORMIN 500 mg oral tablet, extended release: 500 milligram(s) orally once a day.   Start January 3rd one tablet a day.  If no diarrhea or abdominal cramps increase dose to 2 tablets January 17 2020  oxyCODONE 5 mg oral tablet: 1 tab(s) orally every 6 hours, As needed, Severe Pain (7 - 10) MDD:4 tabs  test strips (per patient&#x27;s insurance): 1 application subcutaneously 4 times a day. ** Compatible with patient&#x27;s glucometer **

## 2019-12-27 NOTE — PROGRESS NOTE ADULT - SUBJECTIVE AND OBJECTIVE BOX
Surgery Progress Note  Patient is a 44y old  Male who presents with a chief complaint of Acute Cholecystitis (26 Dec 2019 13:22)      SUBJECTIVE: Patient seen and examined at bedside with surgical team.   Patient states that he has something in the back of his throat that comes out when he coughs.   Also has a sore throat and RUQ pain.   Overall pain is controlled with medication.   On a regular diet. Denies nausea and vomiting.     Vital Signs Last 24 Hrs  T(C): 36.8 (27 Dec 2019 04:44), Max: 37.1 (27 Dec 2019 00:04)  T(F): 98.3 (27 Dec 2019 04:44), Max: 98.8 (27 Dec 2019 00:04)  HR: 82 (27 Dec 2019 04:44) (64 - 96)  BP: 118/71 (27 Dec 2019 04:44) (111/69 - 140/85)  BP(mean): 86 (26 Dec 2019 20:45) (86 - 91)  RR: 18 (27 Dec 2019 04:44) (16 - 20)  SpO2: 90% (27 Dec 2019 04:44) (90% - 98%)    Physical Exam  Constitutional: NAD  Respiratory: breathing comfortably on RA  Abd: soft, distended, appropriately tender, incision site dressing are intact was mild SS staining,   Ext: moving all 4 ext spontaneously     I&O's Detail    26 Dec 2019 07:01  -  27 Dec 2019 07:00  --------------------------------------------------------  IN:    lactated ringers.: 900 mL  Total IN: 900 mL    OUT:    Voided: 950 mL  Total OUT: 950 mL    Total NET: -50 mL      MEDICATIONS  (STANDING):  acetaminophen   Tablet .. 650 milliGRAM(s) Oral every 6 hours  atorvastatin 20 milliGRAM(s) Oral at bedtime  dextrose 5%. 1000 milliLiter(s) (50 mL/Hr) IV Continuous <Continuous>  dextrose 50% Injectable 12.5 Gram(s) IV Push once  dextrose 50% Injectable 25 Gram(s) IV Push once  dextrose 50% Injectable 25 Gram(s) IV Push once  enoxaparin Injectable 40 milliGRAM(s) SubCutaneous daily  ibuprofen  Tablet. 400 milliGRAM(s) Oral every 6 hours  insulin glargine Injectable (LANTUS) 15 Unit(s) SubCutaneous at bedtime  insulin lispro (HumaLOG) corrective regimen sliding scale   SubCutaneous three times a day before meals  insulin lispro (HumaLOG) corrective regimen sliding scale   SubCutaneous at bedtime  insulin lispro Injectable (HumaLOG) 5 Unit(s) SubCutaneous three times a day before meals  lactated ringers. 1000 milliLiter(s) (100 mL/Hr) IV Continuous <Continuous>    MEDICATIONS  (PRN):  dextrose 40% Gel 15 Gram(s) Oral once PRN Blood Glucose LESS THAN 70 milliGRAM(s)/deciliter  glucagon  Injectable 1 milliGRAM(s) IntraMuscular once PRN Glucose LESS THAN 70 milligrams/deciliter  oxyCODONE    IR 5 milliGRAM(s) Oral every 6 hours PRN Severe Pain (7 - 10)      LABS:                        14.0   9.65  )-----------( 304      ( 26 Dec 2019 12:51 )             41.8         135  |  99  |  12  ----------------------------<  167<H>  3.4<L>   |  25  |  0.67    Ca    8.9      26 Dec 2019 12:51    TPro  6.5  /  Alb  3.8  /  TBili  0.6  /  DBili  x   /  AST  16  /  ALT  22  /  AlkPhos  64  12    PT/INR - ( 26 Dec 2019 00:18 )   PT: 13.5 sec;   INR: 1.18 ratio         PTT - ( 26 Dec 2019 00:18 )  PTT:32.8 sec  LIVER FUNCTIONS - ( 26 Dec 2019 12:51 )  Alb: 3.8 g/dL / Pro: 6.5 g/dL / ALK PHOS: 64 U/L / ALT: 22 U/L / AST: 16 U/L / GGT: x           Urinalysis Basic - ( 26 Dec 2019 00:36 )    Color: Light Yellow / Appearance: Clear / S.045 / pH: x  Gluc: x / Ketone: Moderate  / Bili: Negative / Urobili: Negative   Blood: x / Protein: 30 mg/dL / Nitrite: Negative   Leuk Esterase: Negative / RBC: 1 /hpf / WBC 1 /HPF   Sq Epi: x / Non Sq Epi: 0 /hpf / Bacteria: Negative

## 2019-12-27 NOTE — DIETITIAN INITIAL EVALUATION ADULT. - PHYSICAL APPEARANCE
other (specify)/well nourished/obese Ht: 68 inches , Wt: 212 lbs, BMI: 32.3kg/m2, IBW: 154lbs +/- 10%, %IBW: 138%  Edema: none, Skin: free of pressure injuries per nursing flow sheets

## 2019-12-27 NOTE — DISCHARGE NOTE PROVIDER - HOSPITAL COURSE
History of Present Illness:     43yo M with hx of GERD, HTN, and gallstones presented 12/25 with 1 day of mid upper abd pain radiating across his abdomen associated with chills that started after he ate dinner around 6:30pm. He was not nauseous, but tried to induce vomiting to relieve upper abdominal discomfort, but did not vomit. His pain continued without resolution prompting his ED presentation. Patient noted history of right lower abdominal pain ~2yr ago for which he had an U/S that showed gallstones. He also had an episode of lower abdominal pain ~2wk ago after eating that resolved in a few hours. In the ED, WBC~12.6 and U/S showed cholelithiasis with trace pericholecystic fluid, all thickening, positive Rivera's sign, and moderate to high suspicion for acute cholecystitis.         On HD1 patient underwent an lap elly.  Pt. tolerated procedure well and was transferred to the recovery room and then the floor without incidence.  Pt. was mainly managed for postoperative pain, and wound care, as well as close monitoring of fluid resuscitation and return of GI function.  Pt has been tolerating a diet, voiding, ambulating, and the pain is now well controlled.   Pt. is ready for discharge home in stable condition, and will follow up in two weeks as an outpatient with Dr. Wallace.         While hospitalized endocrine was consulted for elevated blood sugar. A1C was 10.7. Patient was started on Lantus and Humalog while in the hospital. Patient also underwent diabetes education with nutrition.         Patient was also evaluated by anesthesia and diagnosed with an inflamed uvula.         Patient is hemodynamically stable and ready for discharge.

## 2019-12-27 NOTE — PROGRESS NOTE ADULT - SUBJECTIVE AND OBJECTIVE BOX
DIABETES FOLLOW UP NOTE: Saw pt earlier today  INTERVAL HX: 43 y/o M w/h/o of uncontrolled and untreated T2DM. Also h/o HTN/HLD/GERD. Here with abdominal pain found to have acute cholecystitis> now s/p laparoscopic  [id number]. Pt stays tolerating POs but not eating much due to surgical discomfort. Also denies flatus/BMs after OR. Glycemic control remains above goal with bg high 100s to low 200s. Per primary team pt going home today.  Pt reports that when he was initially diagnosed with DM he changed diet and increased activity bringing down A1C to 6s. However, has not been following diet/activity as before and feels this is the reason his BGs when up. Also states that the infection on his gallbladder made BG levels worse. States the he self discontinue Metformin due to abd cramps abd didn't f/u with PCP there after.       Review of Systems:  General: As above  Cardiovascular: No chest pain, palpitations  Respiratory: No SOB, no cough  GI: No nausea, vomiting, + surgical pain  Endocrine: no polyuria, polydipsia or S&Sx of hypoglycemia    Allergies    No Known Allergies    Intolerances      MEDICATIONS:   atorvastatin 20 milliGRAM(s) Oral at bedtime  insulin glargine Injectable (LANTUS) 15 Unit(s) SubCutaneous at bedtime  insulin lispro (HumaLOG) corrective regimen sliding scale   SubCutaneous three times a day before meals  insulin lispro (HumaLOG) corrective regimen sliding scale   SubCutaneous at bedtime  insulin lispro Injectable (HumaLOG) 5 Unit(s) SubCutaneous three times a day before meals      PHYSICAL EXAM:  VITALS: T(C): 36.8 (12-27-19 @ 13:14)  T(F): 98.3 (12-27-19 @ 13:14), Max: 98.8 (12-27-19 @ 00:04)  HR: 74 (12-27-19 @ 13:14) (69 - 96)  BP: 127/81 (12-27-19 @ 13:14) (111/69 - 140/85)  RR:  (17 - 20)  SpO2:  (90% - 98%)  Wt(kg): --  GENERAL: Male sitting in a chair  in NAD. Wife and brother at bedside  Abdomen: Soft, nontender, non distended, 4 laparoscopic incision lines D&I  Extremities: Warm, no edema in all 4 exts  NEURO: A&O X3    LABS:  POCT Blood Glucose.: 247 mg/dL (12-27-19 @ 13:08)  POCT Blood Glucose.: 191 mg/dL (12-27-19 @ 09:07)  POCT Blood Glucose.: 277 mg/dL (12-26-19 @ 22:41)  POCT Blood Glucose.: 156 mg/dL (12-26-19 @ 12:21)  POCT Blood Glucose.: 257 mg/dL (12-26-19 @ 08:41)                            13.4   8.61  )-----------( 304      ( 27 Dec 2019 08:58 )             40.2       12-27    136  |  102  |  10  ----------------------------<  237<H>  4.0   |  21<L>  |  0.65    EGFR if : 136  EGFR if non : 118    Ca    8.3<L>      12-27  Mg     1.7     12-27  Phos  3.7     12-27    TPro  6.1  /  Alb  3.5  /  TBili  0.6  /  DBili  x   /  AST  87<H>  /  ALT  102<H>  /  AlkPhos  79  12-27      Hemoglobin A1C, Whole Blood: 10.7 % <H> [4.0 - 5.6] (12-26-19 @ 12:17)

## 2019-12-27 NOTE — DISCHARGE NOTE PROVIDER - NSDCCPCAREPLAN_GEN_ALL_CORE_FT
PRINCIPAL DISCHARGE DIAGNOSIS  Diagnosis: Cholecystitis  Assessment and Plan of Treatment:       SECONDARY DISCHARGE DIAGNOSES  Diagnosis: Diabetes type 2, uncontrolled  Assessment and Plan of Treatment:

## 2019-12-27 NOTE — DIETITIAN INITIAL EVALUATION ADULT. - OTHER INFO
Patient reports overall good PO intake and appetite PTA, consumes 3 meals per day consisting of a variety of foods. Reports he limits intake of sweets and does not drink soda or juice. Reports when initially diagnosed with DM he was able to bring his HbA1c down to 6.5% through dietary changes. Current HbA1c now 10.7% likely due to combination of dietary indiscretion and medication non-compliance. Admits eating more sweets recently due to the holidays. Tries to have well balanced meals (protein, carb, vegetables) to but admits to struggling during lunch due to limited healthy food choices available. States he often eats pizza or calzones. Confirms NKFA. Pt denies chewing/swallowing difficulty, nausea, vomiting, diarrhea, constipation at baseline, admitted abdominal pain x 1 day, found to have acute cholecystitis. Denies micronutrient supplement. Per endocrine pt was prescribed Metformin but stopped taking it due to stomach upset, doesn't check BG at home.     Weights: Pt reports wt has been stable, denies any recent changes. Current dosing weight noted as ~212lbs (12/26).     Pt tolerating regular + carbohydrate consistent diet currently. No acute GI distress noted. Pt seen by Endocrine prior to RD visit, per endocrine NP plan to resume metformin 1 week after discharge with outpatient follow-up. Pt amenable to diet education. Provided education on Carbohydrate Consistent diet including sources of carbohydrates, portion sizes, pairing protein with carbohydrates, limiting sugar sweetened beverages in diet and the importance of consistent eating pattern to help optimize glycemic control. Discussed label reading and carbohydrate counting as well. Reports he plans to bring lunch with him to work to avoid consuming CHO heavy meals. Pt with good baseline knowledge however would benefit from outpatient follow-up. Also discussed avoiding high fat, greasy foods. Patient/wife with no nutrition-related questions at this time. Made aware RD remains available as needed.

## 2019-12-27 NOTE — DISCHARGE NOTE PROVIDER - NSFOLLOWUPCLINICS_GEN_ALL_ED_FT
[facility] Endocrinology  Endocrinology  [address] [place] [address]  Phone: [phone]  Fax:   Follow Up Time: 1 week

## 2019-12-27 NOTE — DIETITIAN INITIAL EVALUATION ADULT. - REASON INDICATOR FOR ASSESSMENT
diet education, HbA1c > 9%    Source: patient, wife, endocrine NP    Pertinent Information: This is a 44 year old M with PMH GERD, HTN, HLD, DM2 not on medications, and gallstones presented with abdominal pain, admitted for management of possible acute cholecystitis. POD #1 lap elly.

## 2019-12-27 NOTE — CHART NOTE - NSCHARTNOTEFT_GEN_A_CORE
STATUS POST:  lap cholecystectomy    POST OPERATIVE DAY #: 0    SUBJECTIVE: Pt seen at bedside on the floor  denies pain  has not amb oob  -/-  denies n/v      Vital Signs Last 24 Hrs  T(C): 37.1 (27 Dec 2019 00:04), Max: 37.1 (27 Dec 2019 00:04)  T(F): 98.8 (27 Dec 2019 00:04), Max: 98.8 (27 Dec 2019 00:04)  HR: 96 (27 Dec 2019 00:04) (63 - 96)  BP: 133/78 (27 Dec 2019 00:04) (111/69 - 140/85)  BP(mean): 86 (26 Dec 2019 20:45) (86 - 91)  RR: 18 (27 Dec 2019 00:04) (16 - 20)  SpO2: 92% (27 Dec 2019 00:04) (90% - 98%)  I&O's Summary    26 Dec 2019 07:  -  27 Dec 2019 00:46  --------------------------------------------------------  IN: 0 mL / OUT: 950 mL / NET: -950 mL      I&O's Detail    26 Dec 2019 07:01  -  27 Dec 2019 00:46  --------------------------------------------------------  IN:  Total IN: 0 mL    OUT:    Voided: 950 mL  Total OUT: 950 mL    Total NET: -950 mL          MEDICATIONS  (STANDING):  acetaminophen   Tablet .. 650 milliGRAM(s) Oral every 6 hours  dextrose 5%. 1000 milliLiter(s) (50 mL/Hr) IV Continuous <Continuous>  dextrose 50% Injectable 12.5 Gram(s) IV Push once  dextrose 50% Injectable 25 Gram(s) IV Push once  dextrose 50% Injectable 25 Gram(s) IV Push once  enoxaparin Injectable 40 milliGRAM(s) SubCutaneous daily  ibuprofen  Tablet. 400 milliGRAM(s) Oral every 6 hours  insulin glargine Injectable (LANTUS) 15 Unit(s) SubCutaneous at bedtime  insulin lispro (HumaLOG) corrective regimen sliding scale   SubCutaneous three times a day before meals  insulin lispro (HumaLOG) corrective regimen sliding scale   SubCutaneous at bedtime  insulin lispro Injectable (HumaLOG) 5 Unit(s) SubCutaneous three times a day before meals  lactated ringers. 1000 milliLiter(s) (100 mL/Hr) IV Continuous <Continuous>    MEDICATIONS  (PRN):  dextrose 40% Gel 15 Gram(s) Oral once PRN Blood Glucose LESS THAN 70 milliGRAM(s)/deciliter  glucagon  Injectable 1 milliGRAM(s) IntraMuscular once PRN Glucose LESS THAN 70 milligrams/deciliter  oxyCODONE    IR 5 milliGRAM(s) Oral every 6 hours PRN Severe Pain (7 - 10)      LABS:                        14.0   9.65  )-----------( 304      ( 26 Dec 2019 12:51 )             41.8         135  |  99  |  12  ----------------------------<  167<H>  3.4<L>   |  25  |  0.67    Ca    8.9      26 Dec 2019 12:51    TPro  6.5  /  Alb  3.8  /  TBili  0.6  /  DBili  x   /  AST  16  /  ALT  22  /  AlkPhos  64      PT/INR - ( 26 Dec 2019 00:18 )   PT: 13.5 sec;   INR: 1.18 ratio         PTT - ( 26 Dec 2019 00:18 )  PTT:32.8 sec  Urinalysis Basic - ( 26 Dec 2019 00:36 )    Color: Light Yellow / Appearance: Clear / S.045 / pH: x  Gluc: x / Ketone: Moderate  / Bili: Negative / Urobili: Negative   Blood: x / Protein: 30 mg/dL / Nitrite: Negative   Leuk Esterase: Negative / RBC: 1 /hpf / WBC 1 /HPF   Sq Epi: x / Non Sq Epi: 0 /hpf / Bacteria: Negative        RADIOLOGY & ADDITIONAL STUDIES:    PHYSICAL EXAM:  Gen: NAD sleeping  Resp: breathing comfortably  CV: appears well perfused  Ab: soft nontended nondistended, incisions c/d/i  Et: grossly symmetrical      A/P: 44y Male s/p lap elly  - Diet: RD CC  - Activity: as tolerated  - Labs:  AM labs f/u  - Pain medication  - DVT ppx  - per endo, humalog 5u premeal, lantus 15u qhs, NICOLAS    RED SURGERY  [id number]

## 2019-12-27 NOTE — PROGRESS NOTE ADULT - ASSESSMENT
43 y/o M w/h/o of uncontrolled and untreated T2DM. Also h/o HTN/HLD/GERD. Here with abdominal pain found to have acute cholecystitis>s/p laparoscopic  [id number]. Tolerating POs but not eating much due to surgical discomfort. Glycemic control remains above goal while on present insulin levels. Per primary team pt going home today. Since pt has not treated DM will start oral meds and evaluate as out pt. Pt also motivated to restart and keep diet and activity levels to improve DM. He would like to be off meds if possible.  Met with patient and reviewed the following:    -A1c LEVEL: Present and goal  -Blood glucose goals: 100s to 150s as out pt  -Glucose monitoring frequency: Fasting and bedtime  -Healthy eating and portion control  -Reviewed Metformin and Januvia action, side effects and use.  -Importance of follow up care  Pt able to verbalized understanding and was able to give teach back  Spent over 30 minutes providing face to face education and setting discharge f/u apt and writing prescriptions

## 2019-12-27 NOTE — DISCHARGE NOTE NURSING/CASE MANAGEMENT/SOCIAL WORK - PATIENT PORTAL LINK FT
You can access the FollowMyHealth Patient Portal offered by [facility] by registering at the following website: [email or web address]. By joining [facility]’s FollowMyHealth portal, you will also be able to view your health information using other applications (apps) compatible with our system.

## 2019-12-27 NOTE — DISCHARGE NOTE PROVIDER - NSDCFUADDINST_GEN_ALL_CORE_FT
For pain control, you may take Percocet for severe pain not helped by Tylenol or Ibuprofen. You may alternate between taking Tylenol and Ibuprofen every 3hrs for pain control. Take oxycodone 1 tabs, every 6 hours, as needed  While taking oxycodone, you may want to take Colace (a stool softener), as the narcotics may cause constipation. For pain control, you may take Percocet for severe pain not helped by Tylenol or Ibuprofen. You may alternate between taking Tylenol and Ibuprofen every 3hrs for pain control. Take oxycodone 1 tabs, every 6 hours, as needed  While taking oxycodone, you may want to take Colace (a stool softener), as the narcotics may cause constipation.    You must have 3 meals a day while taking this medication. Do Not skip meals while taking this medication. For pain control, you may take Percocet for severe pain not helped by Tylenol or Ibuprofen. You may alternate between taking Tylenol and Ibuprofen every 3hrs for pain control. Take oxycodone 1 tabs, every 6 hours, as needed  While taking oxycodone, you may want to take Colace (a stool softener), as the narcotics may cause constipation.    You must have 3 meals a day while taking the glipizide medication. Do Not skip meals while taking this medication.

## 2019-12-27 NOTE — DISCHARGE NOTE PROVIDER - NSDCFUSCHEDAPPT_GEN_ALL_CORE_FT
DAUJAN ARIAS ; 01/17/2020 ; NPP Endocrin [address] [facility] DAJUAN ARIAS ; 01/17/2020 ; NPP Endocrin [address] [facility]  DAJUAN ARIAS ; 03/03/2020 ; NP Endocrin [address] [facility]

## 2019-12-27 NOTE — DISCHARGE NOTE PROVIDER - CARE PROVIDERS DIRECT ADDRESSES
[email or web address]

## 2019-12-27 NOTE — DISCHARGE NOTE PROVIDER - CARE PROVIDER_API CALL
Marty Wallace)  Surgery  [address], [address]  [place] [address]  Phone: [phone]  Fax: [phone]  Follow Up Time:

## 2019-12-27 NOTE — PROGRESS NOTE ADULT - PROBLEM SELECTOR PLAN 1
-test BG ac and hs  -C/w L15 q hs   -Add Humalog 3 units ac meals   -C/w low dose Humalog correction scales ac and hs   DISCHARGE   -Discontinue all insulin.  -Start Januvia 100mg daily  -Start Metformin XR 500mg. Pt to start 1/3/20 since he is having surgical pain and might confuse this as a side effect of Metformin and stop taking med again. Pt aware of XR form benefits over regular Metformin with slow dose titration to avoid GI side effects. Pt to increased Metformin XR to 1000mg 1/17/20 if tolerating med.  Pt to contact PCP if any side effects from meds prescribed.  -Pt to f/u with CDE on 1/17/20 at 1:30pm [address]. Phone   Pt has f/u apt with endocrinologist with Dr Hansen on 3/3/20 at 10:45am [address]. Phone   Wrote  Rxs for: glucose meter/strips/lancets.   -Plan discussed with pt/team. Per team Januvia or DPP4s are not covered by pt's insurance. Told team to change to Glipizide XL 2.5 units daily. Pt must eat 3 meals a day while on sulphonylurea. Due to pt's A1C and inpatient insulin requirements he needs 2 oral meds.  Contact info: [phone] (24/7). pager [phone]

## 2019-12-27 NOTE — DISCHARGE NOTE PROVIDER - NSDCCPTREATMENT_GEN_ALL_CORE_FT
PRINCIPAL PROCEDURE  Procedure: Lap part cholecystectomy  Findings and Treatment: WOUND CARE: You may remove the white band aid starting tomorrow. The steri strips underneath will fall off on their own.   BATHING: Please do not submerge wound underwater. You may shower and/or sponge bathe.  ACTIVITY: No heavy lifting anything more than 10-15lbs or straining. Otherwise, you may return to your usual level of physical activity. If you are taking narcotic pain medication (such as Percocet), do NOT drive a car, operate machinery or make important decisions.  DIET: Diabetic diet   NOTIFY YOUR SURGEON IF: You have any bleeding that does not stop, any pus draining from your wound, any fever (over 100.4 F) or chills, persistent nausea/vomiting with inability to tolerate food or liquids, persistent diarrhea, or if your pain is not controlled on your discharge pain medications.  FOLLOW-UP:  1. Please call to make a follow-up appointment within two weeks of discharge   2. Please follow up with your primary care physician in one week regarding your hospitalization.

## 2019-12-30 LAB — SURGICAL PATHOLOGY STUDY: SIGNIFICANT CHANGE UP

## 2020-01-17 ENCOUNTER — APPOINTMENT (OUTPATIENT)
Dept: ENDOCRINOLOGY | Facility: CLINIC | Age: 45
End: 2020-01-17

## 2020-01-30 NOTE — CONSULT NOTE ADULT - CONSULT REQUESTED BY NAME
Dr. Wallace [Mother] : mother [whole ___ oz/d] : consumes [unfilled] oz of whole cow's milk per day [Fruit] : fruit [Vegetables] : vegetables [Meat] : meat [Grains] : grains [Eggs] : eggs [Dairy] : dairy [Normal] : Normal [Sippy cup use] : Sippy cup use [Brushing teeth] : Brushing teeth [Tap water] : Primary Fluoride Source: Tap water [< 2 hrs of screen time] : Less than 2 hrs of screen time [Appropiate parent-child communication] : Appropriate parent-child communication [Child Cooperates] : Child cooperates [No] : No cigarette smoke exposure [Water heater temperature set at <120 degrees F] : Water heater temperature set at <120 degrees F [Car seat in back seat] : Car seat in back seat [Carbon Monoxide Detectors] : Carbon monoxide detectors [Smoke Detectors] : Smoke detectors no chills/no fever

## 2020-03-06 ENCOUNTER — APPOINTMENT (OUTPATIENT)
Dept: ENDOCRINOLOGY | Facility: CLINIC | Age: 45
End: 2020-03-06
Payer: COMMERCIAL

## 2020-03-06 VITALS
WEIGHT: 218 LBS | SYSTOLIC BLOOD PRESSURE: 122 MMHG | OXYGEN SATURATION: 98 % | HEART RATE: 79 BPM | BODY MASS INDEX: 33.04 KG/M2 | HEIGHT: 68 IN | DIASTOLIC BLOOD PRESSURE: 78 MMHG

## 2020-03-06 PROCEDURE — 99215 OFFICE O/P EST HI 40 MIN: CPT

## 2020-03-06 RX ORDER — METOPROLOL TARTRATE 75 MG/1
TABLET, FILM COATED ORAL
Refills: 0 | Status: ACTIVE | COMMUNITY

## 2020-03-09 LAB
25(OH)D3 SERPL-MCNC: 19.9 NG/ML
ALBUMIN SERPL ELPH-MCNC: 5 G/DL
ALP BLD-CCNC: 63 U/L
ALT SERPL-CCNC: 31 U/L
ANION GAP SERPL CALC-SCNC: 14 MMOL/L
AST SERPL-CCNC: 17 U/L
BILIRUB SERPL-MCNC: 0.5 MG/DL
BUN SERPL-MCNC: 12 MG/DL
C PEPTIDE SERPL-MCNC: 4.9 NG/ML
CALCIUM SERPL-MCNC: 9.5 MG/DL
CHLORIDE SERPL-SCNC: 101 MMOL/L
CHOLEST SERPL-MCNC: 206 MG/DL
CHOLEST/HDLC SERPL: 5 RATIO
CO2 SERPL-SCNC: 24 MMOL/L
CREAT SERPL-MCNC: 0.69 MG/DL
CREAT SPEC-SCNC: 120 MG/DL
GLUCOSE SERPL-MCNC: 168 MG/DL
HDLC SERPL-MCNC: 41 MG/DL
LDLC SERPL CALC-MCNC: 132 MG/DL
MICROALBUMIN 24H UR DL<=1MG/L-MCNC: 5.6 MG/DL
MICROALBUMIN/CREAT 24H UR-RTO: 47 MG/G
POTASSIUM SERPL-SCNC: 4.7 MMOL/L
PROT SERPL-MCNC: 7.3 G/DL
SODIUM SERPL-SCNC: 139 MMOL/L
TRIGL SERPL-MCNC: 165 MG/DL
TSH SERPL-ACNC: 2.01 UIU/ML
VIT B12 SERPL-MCNC: 415 PG/ML

## 2020-03-23 ENCOUNTER — RX RENEWAL (OUTPATIENT)
Age: 45
End: 2020-03-23

## 2020-05-08 ENCOUNTER — APPOINTMENT (OUTPATIENT)
Dept: ENDOCRINOLOGY | Facility: CLINIC | Age: 45
End: 2020-05-08

## 2020-06-08 RX ORDER — METFORMIN HYDROCHLORIDE 625 MG/1
TABLET ORAL
Refills: 0 | Status: DISCONTINUED | COMMUNITY
End: 2020-06-08

## 2020-06-12 ENCOUNTER — APPOINTMENT (OUTPATIENT)
Dept: ENDOCRINOLOGY | Facility: CLINIC | Age: 45
End: 2020-06-12
Payer: COMMERCIAL

## 2020-06-12 VITALS
WEIGHT: 218 LBS | OXYGEN SATURATION: 98 % | HEART RATE: 77 BPM | TEMPERATURE: 98 F | HEIGHT: 68 IN | DIASTOLIC BLOOD PRESSURE: 80 MMHG | SYSTOLIC BLOOD PRESSURE: 110 MMHG | BODY MASS INDEX: 33.04 KG/M2

## 2020-06-12 LAB
GLUCOSE BLDC GLUCOMTR-MCNC: 148
HBA1C MFR BLD HPLC: 7.2

## 2020-06-12 PROCEDURE — 82962 GLUCOSE BLOOD TEST: CPT

## 2020-06-12 PROCEDURE — 83036 HEMOGLOBIN GLYCOSYLATED A1C: CPT | Mod: QW

## 2020-06-12 PROCEDURE — 99215 OFFICE O/P EST HI 40 MIN: CPT | Mod: 25

## 2020-06-12 RX ORDER — BLOOD SUGAR DIAGNOSTIC
STRIP MISCELLANEOUS
Qty: 4 | Refills: 3 | Status: ACTIVE | COMMUNITY
Start: 2020-04-29 | End: 1900-01-01

## 2020-06-12 RX ORDER — LANCETS
EACH MISCELLANEOUS
Qty: 400 | Refills: 1 | Status: ACTIVE | COMMUNITY
Start: 2020-04-29 | End: 1900-01-01

## 2020-06-22 LAB
25(OH)D3 SERPL-MCNC: 21.9 NG/ML
ALBUMIN SERPL ELPH-MCNC: 4.9 G/DL
ALP BLD-CCNC: 61 U/L
ALT SERPL-CCNC: 56 U/L
ANION GAP SERPL CALC-SCNC: 14 MMOL/L
AST SERPL-CCNC: 28 U/L
BILIRUB SERPL-MCNC: 0.8 MG/DL
BUN SERPL-MCNC: 14 MG/DL
CALCIUM SERPL-MCNC: 9.6 MG/DL
CHLORIDE SERPL-SCNC: 100 MMOL/L
CHOLEST SERPL-MCNC: 204 MG/DL
CHOLEST/HDLC SERPL: 4.6 RATIO
CO2 SERPL-SCNC: 25 MMOL/L
CREAT SERPL-MCNC: 0.75 MG/DL
CREAT SPEC-SCNC: 157 MG/DL
GLUCOSE SERPL-MCNC: 146 MG/DL
HDLC SERPL-MCNC: 44 MG/DL
LDLC SERPL CALC-MCNC: 136 MG/DL
MICROALBUMIN 24H UR DL<=1MG/L-MCNC: 4.5 MG/DL
MICROALBUMIN/CREAT 24H UR-RTO: 29 MG/G
POTASSIUM SERPL-SCNC: 4.6 MMOL/L
PROT SERPL-MCNC: 7.1 G/DL
SARS-COV-2 IGG SERPL IA-ACNC: 0.01 INDEX
SARS-COV-2 IGG SERPL QL IA: NEGATIVE
SODIUM SERPL-SCNC: 139 MMOL/L
TRIGL SERPL-MCNC: 120 MG/DL
TSH SERPL-ACNC: 1.53 UIU/ML

## 2020-06-26 ENCOUNTER — APPOINTMENT (OUTPATIENT)
Dept: ENDOCRINOLOGY | Facility: CLINIC | Age: 45
End: 2020-06-26

## 2020-09-04 ENCOUNTER — APPOINTMENT (OUTPATIENT)
Dept: ENDOCRINOLOGY | Facility: CLINIC | Age: 45
End: 2020-09-04

## 2020-10-02 ENCOUNTER — APPOINTMENT (OUTPATIENT)
Dept: ENDOCRINOLOGY | Facility: CLINIC | Age: 45
End: 2020-10-02
Payer: COMMERCIAL

## 2020-10-02 VITALS
HEART RATE: 75 BPM | DIASTOLIC BLOOD PRESSURE: 72 MMHG | WEIGHT: 220 LBS | OXYGEN SATURATION: 98 % | BODY MASS INDEX: 33.34 KG/M2 | HEIGHT: 68 IN | TEMPERATURE: 97.8 F | SYSTOLIC BLOOD PRESSURE: 110 MMHG

## 2020-10-02 PROCEDURE — 99215 OFFICE O/P EST HI 40 MIN: CPT

## 2020-10-02 PROCEDURE — 82962 GLUCOSE BLOOD TEST: CPT

## 2020-10-02 PROCEDURE — 83036 HEMOGLOBIN GLYCOSYLATED A1C: CPT | Mod: QW

## 2020-10-02 NOTE — PHYSICAL EXAM
[Alert] : alert [Well Nourished] : well nourished [Healthy Appearance] : healthy appearance [No Acute Distress] : no acute distress [Well Developed] : well developed [No Respiratory Distress] : no respiratory distress [No Accessory Muscle Use] : no accessory muscle use [Clear to Auscultation] : lungs were clear to auscultation bilaterally [Normal S1, S2] : normal S1 and S2 [Normal Rate] : heart rate was normal [Regular Rhythm] : with a regular rhythm [Not Tender] : non-tender [Not Distended] : not distended [Soft] : abdomen soft [Spine Straight] : spine straight [Normal Gait] : normal gait [No Involuntary Movements] : no involuntary movements were seen [Normal Strength/Tone] : muscle strength and tone were normal [Right Foot Was Examined] : right foot ~C was examined [Left Foot Was Examined] : left foot ~C was examined [No Motor Deficits] : the motor exam was normal [No Tremors] : no tremors [Oriented x3] : oriented to person, place, and time [Normal Affect] : the affect was normal [Normal Insight/Judgement] : insight and judgment were intact [Normal Mood] : the mood was normal

## 2020-10-05 NOTE — ASSESSMENT
[Self Monitoring of Blood Glucose] : self monitoring of blood glucose [Diabetes Foot Care] : diabetes foot care [Long Term Vascular Complications] : long term vascular complications of diabetes [Carbohydrate Consistent Diet] : carbohydrate consistent diet [Importance of Diet and Exercise] : importance of diet and exercise to improve glycemic control, achieve weight loss and improve cardiovascular health [Exercise/Effect on Glucose] : exercise/effect on glucose [Glucagon Use] : glucagon use [Retinopathy Screening] : Patient was referred to ophthalmology for retinopathy screening [FreeTextEntry1] : 46 y/o M w/h/o of uncontrolled T2DM. \par Also h/o HTN/HLD/GERD. \par tachycardia on metoprol \par he is here for a f/u of DM\par \par DM\par a1c 8.8 oct 2020 from 7.2 in June 2020\par \par check labs \par he is on Januiva\par needs another agent \par +jardiance 10mg now SE seen with SGLT2 inhibitor d/w pt this includes : \par UTI, yeast infection, fourneir gangrene..to call us if these symtoms occurs, or pain out of proportion in the gential area.\par metformin if tolerated (500mg BID) +januvia \par kristen \par \par \par 1) DM a1c 10.7 in dec 2019, pt with stress 2019 after loosing father--> 7.2 June 2020 ---> 8.8. oct 2020\par now with diet and lifestyle changes\par \par Patient was also made aware of the physiological implications of poor glycemic control (micro and macrovascular complications including but not related to stroke, CAD, retinopathy, renal disease)\par -Risk of untreated diabetes including vision loss, stroke, heart attack and loss of limbs has been discussed with the pt in detail \par -targets for weight, A1c and FS have been discussed with pt \par \par \par the importance of checking blood glucose and goals fasting and premeals discussed and how this information can be used to help direct insulin and meal choices\par diet education; we discussed carb counting, label reading, meal planning, pre-prandial and post-prandial finger stick goals especially in relation to food (for example to check pre and post meal FSBG after the largest meal to better monitor and change dietary choices) \par -FS goals have been reviewed with the pt in detail: AM <130, premeals< 140, and  post meal, 160-180\par Exercise and healthy eating has been discussed with the pt and its importance in the management of diabetes\par -we had a lengthy discussion regarding healthy diet (consistent carbohydrates and low calorie content) with the patient. \par we also emphasized to maintain routine exercise activity (30 minutes daily or 150 minutes in the week) as tolerated.\par -we also discussed carbs last- eat vegetables and protein prior to carbs which will help control post prandial glucose rise and can even lower insulin levels.\par \par \par - Discussed diagnosis and implications on cardiovascular disease,\par noted goals for each hemoglobin A1C, blood pressure and LDL\par cholesterol\par - Check hemoglobin A1C every 3 months, 6 months if stable \par - BP goal - <130/80\par - ACC/AHA recommendation for statin therapy: adults with LDL cholesterol better than 190, adults age 40 to 75 diabetes, and adults 40 to 75 with a 10 year risk of 7.5 or greater. \par we had a long discussion about this and that we no longer go by goal chlosterol levels but rather that DM is a RF for CV disease \par LDL was elevated last time, d/w pt I would recommend statin and that just with DM alone without the LDL i would go ahead and perscirbe statin\par he does not want statin (risks d/w pt that high LDL is risk factor for MI/stroke)\par pt to discuss with is PCP as well \par \par - Continue/encourage exercise\par - Ophthalmology annually  UTD 2020\par - urine microalbumin ratio, goal <30 mg/g (he is not on ACE/ARB)/ last check 47 and than 29\par - Consider sleep apnea\par - we discussed glycemic goals and need for close and routine followup \par -we discussed importance of self monitoring of blood glucose and to call if glucose <70 or >400\par \par \par pt also should followup with PCP regarding ongoing medical care and followup of medical issues/concerns and exam\par \par \par \par \par 2. low D hx \par s/p errgo \par check levels today\par start maintence D 2000 units daily \par will check levels \par \par \par

## 2020-10-05 NOTE — HISTORY OF PRESENT ILLNESS
[FreeTextEntry1] : 46 y/o M w/h/o of uncontrolled T2DM. \par Also h/o HTN/HLD/GERD. \par tachycardia on metoprol \par he is here for a f/u of DM\par \par had a lot of stress of  last year after loss of dad, he believes this is what impacted his life as he was otherwise well controlled \par \par DMHX:\par the pt is here for DM referral post hospitlization \par in dec 2019 had  laparoscopic [id number]/26, found to have uncontrolled DM\par Pt reports that when he was initially diagnosed with DM he changed diet and\par increased activity bringing down A1C to 6s. \par However, has not been following diet/activity as before and feels this is the reason his BGs when up. Also\par states that the infection on his gallbladder made BG levels worse. States the\par he self discontinue Metformin due to abd cramps\par \par after.\par A1c 10.7\par LFTS elevated\par he was dc on metformin and Glipizide and he states he never received the script \par he takes metformin 500mg once a day , \par Complications of diabetes: \par Micro: denies retinopathy, nephropathy, neuropathy, gastroparesis, orthostasis, foot ulcers, onchymycosis: \par On glucocorticoids: none \par S/p pancreatitis/pancreatectomy: none \par \par 2020\par metformin bothering his stomach- started taking it again but upsets the stomach \par Januvia -100mg \par did not start lipitor \par is not exercising \par +++Stressors, quarantine+++\par \par \par oct 2020\par a1c 8.8 higher from 7.2 in 2020 \par +not had MC at last visit, low vitamin D and high cholesterol suggested liptior (didn’t want to take it)\par -236\[place], was [place]for 2 months so diet had more cars \par once a day in the am \par +januvia only \par does not take metformin bc of diarrhea\par \par +diet: salad, \par diets vary during day \par salad no fried food, cut out bread less \par \par \par Exercise:\par walking  about 1 mile  a day but no dediated exercise, walking with work \par \par \par \par DM HCM\par optho: 2020 \par Podiatry none \par STATIN  none - we discussed this at the last visit at length to be on a chlosterol medication, pt did not want it \par Ace-arb none \par \par \par \par Labs: \par 2020\par mild ALT abnl- at 56 - told to see GI\par \par MC/cr 29\par vitamin D 21 - given ergo \par \par \par Ma./cr 47\par \par AST/ALT \par cepepide \par TSH 2.01\par vitmain  D 19\par  \par \par \par \par 2.vitamin D \par none now \par took it a few times, now goes out into sunlight \par \par \par \par PSH \par chloecstomy \par \par \par \par SH\par manage propteries \par former smoker\par , 4 kids- 22,21.19,14\par \par \par FHX \par mom : preDM\par GM : DM \par father:  prostate cancer \par \par \par \par \par

## 2020-10-05 NOTE — END OF VISIT
[Time Spent: ___ minutes] : I have spent [unfilled] minutes of time on the encounter. [>50% of the face to face encounter time was spent on counseling and/or coordination of care for ___] : Greater than 50% of the face to face encounter time was spent on counseling and/or coordination of care for [unfilled] [FreeTextEntry3] : \par TIME SPENT: educating patient on DM, lifestyle modifications, \par a1c and complications\par 30 minutes reviewing pt history and performing a physical examination.  discussing treatment options, writing prescriptions and 10 min writing his note\par \par

## 2020-10-08 LAB
25(OH)D3 SERPL-MCNC: 29.3 NG/ML
ALBUMIN SERPL ELPH-MCNC: 4.8 G/DL
ALP BLD-CCNC: 70 U/L
ALT SERPL-CCNC: 32 U/L
ANION GAP SERPL CALC-SCNC: 14 MMOL/L
AST SERPL-CCNC: 20 U/L
BILIRUB SERPL-MCNC: 0.5 MG/DL
BUN SERPL-MCNC: 12 MG/DL
C PEPTIDE SERPL-MCNC: 4.4 NG/ML
CALCIUM SERPL-MCNC: 9.4 MG/DL
CHLORIDE SERPL-SCNC: 101 MMOL/L
CO2 SERPL-SCNC: 23 MMOL/L
CREAT SERPL-MCNC: 0.74 MG/DL
CREAT SPEC-SCNC: 191 MG/DL
GLUCOSE BLDC GLUCOMTR-MCNC: 220
GLUCOSE SERPL-MCNC: 218 MG/DL
HBA1C MFR BLD HPLC: 8.8
MICROALBUMIN 24H UR DL<=1MG/L-MCNC: 9.9 MG/DL
MICROALBUMIN/CREAT 24H UR-RTO: 52 MG/G
POTASSIUM SERPL-SCNC: 4.5 MMOL/L
PROT SERPL-MCNC: 7.3 G/DL
SARS-COV-2 IGG SERPL IA-ACNC: <0.1 INDEX
SARS-COV-2 IGG SERPL QL IA: NEGATIVE
SODIUM SERPL-SCNC: 138 MMOL/L
TSH SERPL-ACNC: 1.17 UIU/ML

## 2020-11-06 ENCOUNTER — RX RENEWAL (OUTPATIENT)
Age: 45
End: 2020-11-06

## 2020-11-10 ENCOUNTER — APPOINTMENT (OUTPATIENT)
Dept: ENDOCRINOLOGY | Facility: CLINIC | Age: 45
End: 2020-11-10

## 2021-01-15 ENCOUNTER — APPOINTMENT (OUTPATIENT)
Dept: ENDOCRINOLOGY | Facility: CLINIC | Age: 46
End: 2021-01-15
Payer: COMMERCIAL

## 2021-01-15 VITALS
OXYGEN SATURATION: 98 % | SYSTOLIC BLOOD PRESSURE: 120 MMHG | TEMPERATURE: 97.7 F | DIASTOLIC BLOOD PRESSURE: 84 MMHG | HEIGHT: 68 IN | WEIGHT: 216 LBS | BODY MASS INDEX: 32.74 KG/M2 | HEART RATE: 77 BPM

## 2021-01-15 PROCEDURE — 99072 ADDL SUPL MATRL&STAF TM PHE: CPT

## 2021-01-15 PROCEDURE — 99215 OFFICE O/P EST HI 40 MIN: CPT

## 2021-01-15 RX ORDER — EMPAGLIFLOZIN 10 MG/1
10 TABLET, FILM COATED ORAL
Qty: 90 | Refills: 0 | Status: DISCONTINUED | COMMUNITY
Start: 2020-10-02 | End: 2021-01-15

## 2021-01-15 RX ORDER — SITAGLIPTIN 100 MG/1
100 TABLET, FILM COATED ORAL DAILY
Qty: 1 | Refills: 3 | Status: ACTIVE | COMMUNITY
Start: 2020-04-29 | End: 1900-01-01

## 2021-01-15 RX ORDER — ERGOCALCIFEROL 1.25 MG/1
1.25 MG CAPSULE ORAL
Qty: 8 | Refills: 0 | Status: DISCONTINUED | COMMUNITY
Start: 2020-03-09 | End: 2021-01-15

## 2021-01-21 LAB
25(OH)D3 SERPL-MCNC: 22.3 NG/ML
ALBUMIN SERPL ELPH-MCNC: 4.9 G/DL
ALP BLD-CCNC: 78 U/L
ALT SERPL-CCNC: 61 U/L
ANION GAP SERPL CALC-SCNC: 16 MMOL/L
AST SERPL-CCNC: 30 U/L
BILIRUB SERPL-MCNC: 0.5 MG/DL
BUN SERPL-MCNC: 17 MG/DL
CALCIUM SERPL-MCNC: 9.7 MG/DL
CHLORIDE SERPL-SCNC: 101 MMOL/L
CO2 SERPL-SCNC: 21 MMOL/L
CREAT SERPL-MCNC: 0.85 MG/DL
CREAT SPEC-SCNC: 107 MG/DL
ESTIMATED AVERAGE GLUCOSE: 189 MG/DL
GLUCOSE SERPL-MCNC: 140 MG/DL
HBA1C MFR BLD HPLC: 8.2 %
MICROALBUMIN 24H UR DL<=1MG/L-MCNC: 6.4 MG/DL
MICROALBUMIN/CREAT 24H UR-RTO: 60 MG/G
POTASSIUM SERPL-SCNC: 4.8 MMOL/L
PROT SERPL-MCNC: 7.6 G/DL
SODIUM SERPL-SCNC: 138 MMOL/L
TSH SERPL-ACNC: 1.89 UIU/ML
VIT B12 SERPL-MCNC: 388 PG/ML

## 2021-01-25 ENCOUNTER — NON-APPOINTMENT (OUTPATIENT)
Age: 46
End: 2021-01-25

## 2021-02-08 ENCOUNTER — APPOINTMENT (OUTPATIENT)
Dept: ORTHOPEDIC SURGERY | Facility: CLINIC | Age: 46
End: 2021-02-08

## 2021-02-08 ENCOUNTER — APPOINTMENT (OUTPATIENT)
Dept: ORTHOPEDIC SURGERY | Facility: CLINIC | Age: 46
End: 2021-02-08
Payer: COMMERCIAL

## 2021-02-08 VITALS — SYSTOLIC BLOOD PRESSURE: 139 MMHG | HEART RATE: 74 BPM | DIASTOLIC BLOOD PRESSURE: 96 MMHG

## 2021-02-08 VITALS — TEMPERATURE: 95.2 F

## 2021-02-08 DIAGNOSIS — M72.2 PLANTAR FASCIAL FIBROMATOSIS: ICD-10-CM

## 2021-02-08 DIAGNOSIS — M21.6X2 OTHER ACQUIRED DEFORMITIES OF LEFT FOOT: ICD-10-CM

## 2021-02-08 DIAGNOSIS — M21.6X9 OTHER ACQUIRED DEFORMITIES OF UNSPECIFIED FOOT: ICD-10-CM

## 2021-02-08 DIAGNOSIS — M94.279 CHONDROMALACIA, UNSPECIFIED ANKLE AND JOINTS OF FOOT: ICD-10-CM

## 2021-02-08 PROCEDURE — 99214 OFFICE O/P EST MOD 30 MIN: CPT

## 2021-02-08 PROCEDURE — 99072 ADDL SUPL MATRL&STAF TM PHE: CPT

## 2021-02-08 PROCEDURE — 73630 X-RAY EXAM OF FOOT: CPT | Mod: LT

## 2021-02-11 ENCOUNTER — APPOINTMENT (OUTPATIENT)
Dept: ORTHOPEDIC SURGERY | Facility: CLINIC | Age: 46
End: 2021-02-11

## 2021-03-11 ENCOUNTER — APPOINTMENT (OUTPATIENT)
Dept: ENDOCRINOLOGY | Facility: CLINIC | Age: 46
End: 2021-03-11
Payer: COMMERCIAL

## 2021-03-11 VITALS — WEIGHT: 222 LBS | BODY MASS INDEX: 33.76 KG/M2

## 2021-03-11 PROCEDURE — G0108 DIAB MANAGE TRN  PER INDIV: CPT

## 2021-03-11 PROCEDURE — 99072 ADDL SUPL MATRL&STAF TM PHE: CPT

## 2021-03-17 ENCOUNTER — NON-APPOINTMENT (OUTPATIENT)
Age: 46
End: 2021-03-17

## 2021-03-17 NOTE — DIETITIAN INITIAL EVALUATION ADULT. - EST PROTEIN NEEDS5
LM for pt to call back if she has questions or concerns post procedure.  Pt is to follow up with Dr. Granados   69.8

## 2021-04-12 ENCOUNTER — RX RENEWAL (OUTPATIENT)
Age: 46
End: 2021-04-12

## 2021-04-16 ENCOUNTER — APPOINTMENT (OUTPATIENT)
Dept: ENDOCRINOLOGY | Facility: CLINIC | Age: 46
End: 2021-04-16
Payer: COMMERCIAL

## 2021-04-16 VITALS
TEMPERATURE: 98.2 F | HEIGHT: 68 IN | BODY MASS INDEX: 33.65 KG/M2 | HEART RATE: 79 BPM | SYSTOLIC BLOOD PRESSURE: 118 MMHG | WEIGHT: 222 LBS | OXYGEN SATURATION: 99 % | DIASTOLIC BLOOD PRESSURE: 80 MMHG

## 2021-04-16 DIAGNOSIS — E78.5 HYPERLIPIDEMIA, UNSPECIFIED: ICD-10-CM

## 2021-04-16 DIAGNOSIS — R79.89 OTHER SPECIFIED ABNORMAL FINDINGS OF BLOOD CHEMISTRY: ICD-10-CM

## 2021-04-16 DIAGNOSIS — Z00.00 ENCOUNTER FOR GENERAL ADULT MEDICAL EXAMINATION W/OUT ABNORMAL FINDINGS: ICD-10-CM

## 2021-04-16 LAB
GLUCOSE BLDC GLUCOMTR-MCNC: 186
HBA1C MFR BLD HPLC: 8.2

## 2021-04-16 PROCEDURE — 99072 ADDL SUPL MATRL&STAF TM PHE: CPT

## 2021-04-16 PROCEDURE — 82962 GLUCOSE BLOOD TEST: CPT

## 2021-04-16 PROCEDURE — 99215 OFFICE O/P EST HI 40 MIN: CPT | Mod: 25

## 2021-04-16 PROCEDURE — 83036 HEMOGLOBIN GLYCOSYLATED A1C: CPT | Mod: QW

## 2021-04-20 ENCOUNTER — NON-APPOINTMENT (OUTPATIENT)
Age: 46
End: 2021-04-20

## 2021-04-20 PROBLEM — E78.5 HYPERLIPIDEMIA, UNSPECIFIED HYPERLIPIDEMIA TYPE: Status: ACTIVE | Noted: 2020-06-16

## 2021-04-20 PROBLEM — R79.89 LOW VITAMIN D LEVEL: Status: ACTIVE | Noted: 2020-03-09

## 2021-04-20 LAB
25(OH)D3 SERPL-MCNC: 24.8 NG/ML
CHOLEST SERPL-MCNC: 216 MG/DL
CREAT SPEC-SCNC: 117 MG/DL
HDLC SERPL-MCNC: 43 MG/DL
LDLC SERPL CALC-MCNC: 144 MG/DL
MICROALBUMIN 24H UR DL<=1MG/L-MCNC: 8.7 MG/DL
MICROALBUMIN/CREAT 24H UR-RTO: 74 MG/G
NONHDLC SERPL-MCNC: 173 MG/DL
TRIGL SERPL-MCNC: 145 MG/DL
TSH SERPL-ACNC: 1.79 UIU/ML
VIT B12 SERPL-MCNC: 316 PG/ML

## 2021-05-14 ENCOUNTER — RX RENEWAL (OUTPATIENT)
Age: 46
End: 2021-05-14

## 2021-05-14 RX ORDER — DULAGLUTIDE 0.75 MG/.5ML
0.75 INJECTION, SOLUTION SUBCUTANEOUS
Qty: 2 | Refills: 2 | Status: ACTIVE | COMMUNITY
Start: 2021-04-16 | End: 1900-01-01

## 2021-06-08 ENCOUNTER — APPOINTMENT (OUTPATIENT)
Dept: ENDOCRINOLOGY | Facility: CLINIC | Age: 46
End: 2021-06-08
Payer: COMMERCIAL

## 2021-06-08 VITALS — WEIGHT: 225.2 LBS | BODY MASS INDEX: 34.24 KG/M2

## 2021-06-08 DIAGNOSIS — E11.65 TYPE 2 DIABETES MELLITUS WITH HYPERGLYCEMIA: ICD-10-CM

## 2021-06-08 PROCEDURE — G0108 DIAB MANAGE TRN  PER INDIV: CPT

## 2021-06-21 ENCOUNTER — RX RENEWAL (OUTPATIENT)
Age: 46
End: 2021-06-21

## 2021-06-24 ENCOUNTER — NON-APPOINTMENT (OUTPATIENT)
Age: 46
End: 2021-06-24

## 2021-08-23 ENCOUNTER — APPOINTMENT (OUTPATIENT)
Dept: ENDOCRINOLOGY | Facility: CLINIC | Age: 46
End: 2021-08-23

## 2021-10-04 RX ORDER — METFORMIN ER 500 MG 500 MG/1
500 TABLET ORAL
Qty: 180 | Refills: 0 | Status: ACTIVE | COMMUNITY
Start: 2020-03-06 | End: 1900-01-01

## 2021-10-04 RX ORDER — METFORMIN ER 500 MG 500 MG/1
500 TABLET ORAL TWICE DAILY
Qty: 360 | Refills: 0 | Status: DISCONTINUED | COMMUNITY
Start: 2020-10-02 | End: 2021-10-04

## 2021-10-06 ENCOUNTER — NON-APPOINTMENT (OUTPATIENT)
Age: 46
End: 2021-10-06

## 2022-01-11 ENCOUNTER — RX RENEWAL (OUTPATIENT)
Age: 47
End: 2022-01-11

## 2022-01-25 ENCOUNTER — RX RENEWAL (OUTPATIENT)
Age: 47
End: 2022-01-25

## 2025-01-31 NOTE — ED PROVIDER NOTE - CONTACT TIME
CXL <24hrs. Patient states that her brother was rushed to the hospital yesterday and she is unable to make today's appt. Patient has been reminded of 2/3 appt.    26-Dec-2019 02:07